# Patient Record
Sex: MALE | Race: WHITE | NOT HISPANIC OR LATINO | Employment: FULL TIME | URBAN - METROPOLITAN AREA
[De-identification: names, ages, dates, MRNs, and addresses within clinical notes are randomized per-mention and may not be internally consistent; named-entity substitution may affect disease eponyms.]

---

## 2019-03-12 ENCOUNTER — HOSPITAL ENCOUNTER (EMERGENCY)
Facility: OTHER | Age: 29
Discharge: HOME | End: 2019-03-13
Attending: EMERGENCY MEDICINE

## 2019-03-12 DIAGNOSIS — S09.90XA INJURY OF HEAD, INITIAL ENCOUNTER: Primary | ICD-10-CM

## 2019-03-12 DIAGNOSIS — S01.01XA SCALP LACERATION, INITIAL ENCOUNTER: ICD-10-CM

## 2019-03-12 PROCEDURE — 12034 INTMD RPR S/TR/EXT 7.6-12.5: CPT

## 2019-03-12 PROCEDURE — 99282 EMERGENCY DEPT VISIT SF MDM: CPT | Mod: 25

## 2019-03-12 RX ORDER — LIDOCAINE HYDROCHLORIDE 10 MG/ML
10 INJECTION INFILTRATION; PERINEURAL
Status: COMPLETED | OUTPATIENT
Start: 2019-03-12 | End: 2019-03-13

## 2019-03-13 ENCOUNTER — NURSE TRIAGE (OUTPATIENT)
Dept: ADMINISTRATIVE | Facility: CLINIC | Age: 29
End: 2019-03-13

## 2019-03-13 VITALS
WEIGHT: 140 LBS | TEMPERATURE: 98 F | SYSTOLIC BLOOD PRESSURE: 108 MMHG | HEIGHT: 70 IN | HEART RATE: 68 BPM | RESPIRATION RATE: 16 BRPM | BODY MASS INDEX: 20.04 KG/M2 | OXYGEN SATURATION: 98 % | DIASTOLIC BLOOD PRESSURE: 57 MMHG

## 2019-03-13 PROCEDURE — 90715 TDAP VACCINE 7 YRS/> IM: CPT | Performed by: EMERGENCY MEDICINE

## 2019-03-13 PROCEDURE — 63600175 PHARM REV CODE 636 W HCPCS: Performed by: EMERGENCY MEDICINE

## 2019-03-13 PROCEDURE — 25000003 PHARM REV CODE 250: Performed by: EMERGENCY MEDICINE

## 2019-03-13 PROCEDURE — 90471 IMMUNIZATION ADMIN: CPT | Performed by: EMERGENCY MEDICINE

## 2019-03-13 RX ADMIN — CLOSTRIDIUM TETANI TOXOID ANTIGEN (FORMALDEHYDE INACTIVATED), CORYNEBACTERIUM DIPHTHERIAE TOXOID ANTIGEN (FORMALDEHYDE INACTIVATED), BORDETELLA PERTUSSIS TOXOID ANTIGEN (GLUTARALDEHYDE INACTIVATED), BORDETELLA PERTUSSIS FILAMENTOUS HEMAGGLUTININ ANTIGEN (FORMALDEHYDE INACTIVATED), BORDETELLA PERTUSSIS PERTACTIN ANTIGEN, AND BORDETELLA PERTUSSIS FIMBRIAE 2/3 ANTIGEN 0.5 ML: 5; 2; 2.5; 5; 3; 5 INJECTION, SUSPENSION INTRAMUSCULAR at 12:03

## 2019-03-13 RX ADMIN — LIDOCAINE HYDROCHLORIDE 10 ML: 10 INJECTION, SOLUTION EPIDURAL; INFILTRATION; INTRACAUDAL; PERINEURAL at 12:03

## 2019-03-13 NOTE — ED TRIAGE NOTES
Pt arrives to ED with c/o head injury. PT reports playing baseball and falling hitting head, pt denies  LOC, N/VD, chest pain SOB. PT reports headache 6/10. 3 cm contusion noted to pts scalp with 1 cm laceration, no active bleeding at this time. Pt respiraitons even, unlabored, eyes open spontaneously, AAOx4. Pt placed on BP cycling and pulse ox.

## 2019-03-13 NOTE — ED PROVIDER NOTES
Encounter Date: 3/12/2019    SCRIBE #1 NOTE: I, Sosadayna Chong, am scribing for, and in the presence of, Dr. Freire.       History     Chief Complaint   Patient presents with    Head Injury     hit in head with a softball, laceration to posterior scalp. denies LOC.      Time seen by provider: 10:54 PM    This is a 28 y.o. male who presents with complaint of laceration to posterior head after falling onto ground and being kicked in the head by a cleat while playing softball.  He reports minimal headache, but most of his pain is in the laceration.  He denies any LOC.  He denies nausea, vomiting, or blurry vision.  He denies UTD tetanus.      The history is provided by the patient.     Review of patient's allergies indicates:  No Known Allergies  Past Medical History:   Diagnosis Date    GERD (gastroesophageal reflux disease)      Past Surgical History:   Procedure Laterality Date    none       Family History   Problem Relation Age of Onset    Hypertension Mother     Diabetes Paternal Grandmother     Stroke Paternal Grandfather      Social History     Tobacco Use    Smoking status: Never Smoker    Smokeless tobacco: Never Used   Substance Use Topics    Alcohol use: Yes     Alcohol/week: 1.8 oz     Types: 3 Standard drinks or equivalent per week     Comment: 3 drinks per week    Drug use: No     Review of Systems   Constitutional: Negative for chills and fever.   HENT: Negative for facial swelling.    Eyes: Negative for visual disturbance.   Respiratory: Negative for cough and shortness of breath.    Cardiovascular: Negative for chest pain.   Gastrointestinal: Negative for abdominal pain, diarrhea, nausea and vomiting.   Genitourinary: Negative for decreased urine volume, difficulty urinating, dysuria, enuresis, frequency, hematuria and urgency.   Musculoskeletal: Negative for back pain.   Skin: Positive for wound. Negative for color change, pallor and rash.   Neurological: Positive for headaches.  Negative for syncope.   Hematological: Negative for adenopathy. Does not bruise/bleed easily.       Physical Exam     Initial Vitals [03/12/19 2136]   BP Pulse Resp Temp SpO2   (!) 161/103 79 16 98.2 °F (36.8 °C) 99 %      MAP       --         Physical Exam    Nursing note and vitals reviewed.  Constitutional: He appears well-developed and well-nourished. He is not diaphoretic. No distress.   HENT:   Head: Normocephalic and atraumatic.   Mouth/Throat: No oropharyngeal exudate.   Posterior scalp laceration of approximately 10 cm linear with macerated central area.   Eyes: Conjunctivae and EOM are normal. No scleral icterus.   Neck: Normal range of motion. Neck supple.   No C-spine tenderness   Pulmonary/Chest: No respiratory distress.   Musculoskeletal: Normal range of motion. He exhibits no edema or tenderness.   Neurological: He is alert and oriented to person, place, and time. He has normal strength. No cranial nerve deficit or sensory deficit.   Skin: Skin is warm and dry. No rash noted. No erythema. No pallor.   Psychiatric: He has a normal mood and affect. His behavior is normal. Judgment and thought content normal.         ED Course   Lac Repair  Date/Time: 3/12/2019 11:07 PM  Performed by: Ye Freire MD  Authorized by: Ye Freire MD   Consent Done: Not Needed  Body area: head/neck  Location details: scalp  Laceration length: 10 cm  Tendon involvement: none  Nerve involvement: none  Vascular damage: no  Anesthesia: local infiltration    Anesthesia:  Local Anesthetic: lidocaine 1% without epinephrine  Anesthetic total: 5 mL  Patient sedated: no  Preparation: Patient was prepped and draped in the usual sterile fashion.  Irrigation solution: saline  Irrigation method: jet lavage  Amount of cleaning: extensive  Debridement: none  Degree of undermining: none  Skin closure: 4-0 Prolene  Number of sutures: 8  Technique: simple and horizontal mattress  Approximation: close  Approximation  difficulty: simple  Dressing: antibiotic ointment  Patient tolerance: Patient tolerated the procedure well with no immediate complications        Labs Reviewed - No data to display       Imaging Results    None          Medical Decision Making:   Initial Assessment:       28-year-old male presents s/p minor head trauma with posterior scalp laceration.  He was accidentally stepped on with a cleat while playing softball; no LOC or other concerning findings to suggest intracranial hemorrhage. Minimal headache now, and no other injuries or C-spine tenderness.   Laceration is 10 cm long with to 4 cm linear portions at either end, with 2 cm central macerated portion.  Due to central portion and wound not approximating easily, I do not think he has good candidate for staples.  Tetanus updated in ED.    After wound extensively cleaned and patient anesthetized, Prolene sutures used since it is blue and more easily found in patient's long hair.  Central portion closed with horizontal mattress technique, and linear portion closed with simple interrupted sutures with good cosmetic result.  Patient tolerated procedure well and will follow up in 10 days for suture removal.  He was educated on wound care and signs of wound infection, as well as head trauma precautions, and will return for this or any other concerns.                 Scribe Attestation:   Scribe #1: I performed the above scribed service and the documentation accurately describes the services I performed. I attest to the accuracy of the note.    Attending Attestation:           Physician Attestation for Scribe:  Physician Attestation Statement for Scribe #1: I, Dr. Freire, reviewed documentation, as scribed by in my presence, and it is both accurate and complete.                    Clinical Impression:     1. Injury of head, initial encounter    2. Scalp laceration, initial encounter                                 Ye Freire MD  03/16/19 0056

## 2019-03-14 NOTE — TELEPHONE ENCOUNTER
Girlfriend reported pt has sutures to back of head last night after hitting his head. Tonight they noted some dry blood and when they touch area there is a little wet blood, pt does have long hair. Is not noted to be actively bleeding and sutures are intact.    Reason for Disposition   Nursing judgment    Protocols used: ST NO GUIDELINE OR REFERENCE [id number]

## 2020-02-05 ENCOUNTER — TELEPHONE (OUTPATIENT)
Dept: INTERNAL MEDICINE | Facility: CLINIC | Age: 30
End: 2020-02-05

## 2020-02-05 NOTE — TELEPHONE ENCOUNTER
Spoke with pt and scheduled appt.  ----- Message from Melody Lauren sent at 2/5/2020  1:41 PM CST -----  Contact: pt-- [phone]  Type:  Sooner Apoointment Request    Caller is requesting a sooner appointment.  Caller declined first available appointment listed below.  Caller will not accept being placed on the waitlist and is requesting a message be sent to doctor.  Name of Caller: Pt    When is the first available appointment?3/12    Symptoms: injured wrist    Would the patient rather a call back or a response via Re[facility]? Call    Best Call Back Number: [phone]    Additional Information:  Pt called to schedule with the first available provider at the [facility]. He is requesting a call back.

## 2020-02-10 ENCOUNTER — LAB VISIT (OUTPATIENT)
Dept: LAB | Facility: OTHER | Age: 30
End: 2020-02-10
Attending: FAMILY MEDICINE
Payer: COMMERCIAL

## 2020-02-10 ENCOUNTER — TELEPHONE (OUTPATIENT)
Dept: INTERNAL MEDICINE | Facility: CLINIC | Age: 30
End: 2020-02-10

## 2020-02-10 ENCOUNTER — OFFICE VISIT (OUTPATIENT)
Dept: INTERNAL MEDICINE | Facility: CLINIC | Age: 30
End: 2020-02-10
Attending: FAMILY MEDICINE
Payer: COMMERCIAL

## 2020-02-10 VITALS
OXYGEN SATURATION: 99 % | SYSTOLIC BLOOD PRESSURE: 128 MMHG | RESPIRATION RATE: 12 BRPM | WEIGHT: 131.63 LBS | HEIGHT: 70 IN | BODY MASS INDEX: 18.85 KG/M2 | HEART RATE: 64 BPM | DIASTOLIC BLOOD PRESSURE: 88 MMHG

## 2020-02-10 DIAGNOSIS — J30.9 CHRONIC ALLERGIC RHINITIS: ICD-10-CM

## 2020-02-10 DIAGNOSIS — M25.531 CHRONIC PAIN OF RIGHT WRIST: ICD-10-CM

## 2020-02-10 DIAGNOSIS — G89.29 CHRONIC PAIN OF RIGHT WRIST: ICD-10-CM

## 2020-02-10 DIAGNOSIS — Z00.00 PREVENTATIVE HEALTH CARE: ICD-10-CM

## 2020-02-10 DIAGNOSIS — Z00.00 PREVENTATIVE HEALTH CARE: Primary | ICD-10-CM

## 2020-02-10 LAB
ALBUMIN SERPL BCP-MCNC: 5 G/DL (ref 3.5–5.2)
ALP SERPL-CCNC: 90 U/L (ref 55–135)
ALT SERPL W/O P-5'-P-CCNC: 24 U/L (ref 10–44)
ANION GAP SERPL CALC-SCNC: 5 MMOL/L (ref 8–16)
AST SERPL-CCNC: 26 U/L (ref 10–40)
BILIRUB SERPL-MCNC: 0.5 MG/DL (ref 0.1–1)
BUN SERPL-MCNC: 11 MG/DL (ref 6–20)
CALCIUM SERPL-MCNC: 10 MG/DL (ref 8.7–10.5)
CHLORIDE SERPL-SCNC: 105 MMOL/L (ref 95–110)
CHOLEST SERPL-MCNC: 174 MG/DL (ref 120–199)
CHOLEST/HDLC SERPL: 2.6 {RATIO} (ref 2–5)
CO2 SERPL-SCNC: 29 MMOL/L (ref 23–29)
CREAT SERPL-MCNC: 1.1 MG/DL (ref 0.5–1.4)
EST. GFR  (AFRICAN AMERICAN): >60 ML/MIN/1.73 M^2
EST. GFR  (NON AFRICAN AMERICAN): >60 ML/MIN/1.73 M^2
ESTIMATED AVG GLUCOSE: 100 MG/DL (ref 68–131)
GLUCOSE SERPL-MCNC: 92 MG/DL (ref 70–110)
HBA1C MFR BLD HPLC: 5.1 % (ref 4–5.6)
HDLC SERPL-MCNC: 68 MG/DL (ref 40–75)
HDLC SERPL: 39.1 % (ref 20–50)
LDLC SERPL CALC-MCNC: 91 MG/DL (ref 63–159)
NONHDLC SERPL-MCNC: 106 MG/DL
POTASSIUM SERPL-SCNC: 4.5 MMOL/L (ref 3.5–5.1)
PROT SERPL-MCNC: 8.1 G/DL (ref 6–8.4)
SODIUM SERPL-SCNC: 139 MMOL/L (ref 136–145)
TRIGL SERPL-MCNC: 75 MG/DL (ref 30–150)

## 2020-02-10 PROCEDURE — 99999 PR PBB SHADOW E&M-EST. PATIENT-LVL IV: ICD-10-PCS | Mod: PBBFAC,,, | Performed by: FAMILY MEDICINE

## 2020-02-10 PROCEDURE — 80061 LIPID PANEL: CPT

## 2020-02-10 PROCEDURE — 99385 PR PREVENTIVE VISIT,NEW,18-39: ICD-10-PCS | Mod: S$GLB,,, | Performed by: FAMILY MEDICINE

## 2020-02-10 PROCEDURE — 99999 PR PBB SHADOW E&M-EST. PATIENT-LVL IV: CPT | Mod: PBBFAC,,, | Performed by: FAMILY MEDICINE

## 2020-02-10 PROCEDURE — 83036 HEMOGLOBIN GLYCOSYLATED A1C: CPT

## 2020-02-10 PROCEDURE — 36415 COLL VENOUS BLD VENIPUNCTURE: CPT

## 2020-02-10 PROCEDURE — 80053 COMPREHEN METABOLIC PANEL: CPT

## 2020-02-10 PROCEDURE — 99385 PREV VISIT NEW AGE 18-39: CPT | Mod: S$GLB,,, | Performed by: FAMILY MEDICINE

## 2020-02-10 RX ORDER — CETIRIZINE HYDROCHLORIDE 10 MG/1
10 TABLET ORAL DAILY PRN
COMMUNITY
End: 2020-11-12

## 2020-02-10 RX ORDER — LORATADINE 10 MG/1
10 TABLET ORAL DAILY PRN
COMMUNITY
End: 2020-11-12

## 2020-02-10 NOTE — PROGRESS NOTES
"CHIEF COMPLAINT: Establish a primary care physician    HISTORY OF PRESENT ILLNESS: The patient is a generally healthy 29 year-old RHD white male.  The patient 8 months fell on his right hand/wrist.  There was never a deformity or significant swelling or bruising.  He had significant enough pain that he stopped working out entirely for a month and was quite uncomfortable even shaking hands.  He has sought No medical attn yet.  It has been a while since the patient has seen a primary care physician.  The patient wishes to establish a primary care physician.  The patient would also like to get some basic blood work done.    He has a long history of chronic allergic rhinitis.  Over-the-counter medications do not help.    REVIEW OF SYSTEMS:  GENERAL: No fever, chills, fatigability or weight loss.  SKIN: No rashes, itching or changes in color or texture of skin.  HEAD: No headaches or recent head trauma.  EYES: Visual acuity fine. No photophobia, ocular pain or diplopia.  EARS: Denies ear pain, discharge or vertigo.  NOSE: No loss of smell, no epistaxis.  MOUTH & THROAT: No hoarseness or change in voice. No excessive gum bleeding.  NODES: Denies swollen glands.  CHEST: Denies VELASQUEZ, cyanosis, wheezing, cough and sputum production.  CARDIOVASCULAR: Denies chest pain, PND, orthopnea or reduced exercise tolerance.  ABDOMEN: Appetite fine. No weight loss. Denies diarrhea, abdominal pain, hematemesis or blood in stool.  URINARY: No flank pain, dysuria or hematuria.  PERIPHERAL VASCULAR: No claudication or cyanosis.  MUSCULOSKELETAL: No joint stiffness or swelling. Denies back pain.  NEUROLOGIC: No history of seizures, paralysis, alteration of gait or coordination.    SOCIAL HISTORY: The patient does not smoke.  The patient consumes alcohol socially.  The patient is single.    PHYSICAL EXAMINATION:   Blood pressure 128/88, pulse 64, resp. rate 12, height 5' 10" (1.778 m), weight 59.7 kg (131 lb 9.8 oz), SpO2 99 %.    APPEARANCE: " Well nourished, well developed, in no acute distress.    HEAD: Normocephalic, atraumatic.  EYES: PERRL. EOMI.  Conjunctivae without injection and  anicteric  EARS: TM's intact. Light reflex normal. No retraction or perforation.    NOSE: Mucosa pink. Airway clear.  MOUTH & THROAT: No tonsillar enlargement. No pharyngeal erythema or exudate. No stridor.  NECK: Supple.   NODES: No cervical, axillary or inguinal lymph node enlargement.  CHEST: Lungs clear to auscultation.  No retractions are noted.  No rales or rhonchi are present.  CARDIOVASCULAR: Normal S1, S2. No rubs, murmurs or gallops.  ABDOMEN: Bowel sounds normal. Not distended. Soft. No tenderness or masses.  No ascites is noted.  MUSCULOSKELETAL:  There is no clubbing, cyanosis, or edema of the extremities x4.  There is full range of motion of the lumbar spine.  There is full range of motion of the extremities x4.  There is no deformity noted.  There is tenderness with range of motion of the right wrist  NEUROLOGIC:       Normal speech development.      Hearing normal.      Normal gait.      Motor and sensory exams grossly normal.  PSYCHIATRIC: Patient is alert and oriented x3.  Thought processes are all normal.  There is no homicidality.  There is no suicidality.  There is no evidence of psychosis.    LABORATORY/RADIOLOGY:   Chart reviewed.  We will update blood work today.    ASSESSMENT:   Annual  Right wrist pain  Chronic allergic rhinitis    PLAN:  Orthopedics referral  ENT referral  Follow up blood work  Return to clinic in one year.

## 2020-02-11 NOTE — TELEPHONE ENCOUNTER
"Attempted to call pt. No answer, LVM informing pt "Blood work looks good as we expected.  Cholesterol is particularly good.  No changes in medications.  Followup as scheduled " and to give the office a call back     "

## 2020-02-11 NOTE — TELEPHONE ENCOUNTER
----- Message from Oscar Berrios MD sent at 2/11/2020  7:59 AM CST -----  Blood work looks good as we expected.  Cholesterol is particularly good.  No changes in medications.  Followup as scheduled.

## 2020-02-17 ENCOUNTER — TELEPHONE (OUTPATIENT)
Dept: ORTHOPEDICS | Facility: CLINIC | Age: 30
End: 2020-02-17

## 2020-02-17 DIAGNOSIS — R52 PAIN: Primary | ICD-10-CM

## 2020-02-17 NOTE — TELEPHONE ENCOUNTER
Call placed to patient to remind them of appointment and xrays needed beforehand. Voicemail left for patient.       Jayla Torres LPN

## 2020-02-19 ENCOUNTER — HOSPITAL ENCOUNTER (OUTPATIENT)
Dept: RADIOLOGY | Facility: OTHER | Age: 30
Discharge: HOME | End: 2020-02-19
Attending: PHYSICIAN ASSISTANT
Payer: COMMERCIAL

## 2020-02-19 ENCOUNTER — OFFICE VISIT (OUTPATIENT)
Dept: ORTHOPEDICS | Facility: CLINIC | Age: 30
End: 2020-02-19
Attending: FAMILY MEDICINE
Payer: COMMERCIAL

## 2020-02-19 VITALS
DIASTOLIC BLOOD PRESSURE: 82 MMHG | BODY MASS INDEX: 18.75 KG/M2 | HEART RATE: 68 BPM | SYSTOLIC BLOOD PRESSURE: 117 MMHG | HEIGHT: 70 IN | WEIGHT: 131 LBS

## 2020-02-19 DIAGNOSIS — G89.29 CHRONIC PAIN OF RIGHT WRIST: ICD-10-CM

## 2020-02-19 DIAGNOSIS — R52 PAIN: ICD-10-CM

## 2020-02-19 DIAGNOSIS — M25.531 CHRONIC PAIN OF RIGHT WRIST: ICD-10-CM

## 2020-02-19 PROCEDURE — 3008F BODY MASS INDEX DOCD: CPT | Mod: CPTII,S$GLB,, | Performed by: PHYSICIAN ASSISTANT

## 2020-02-19 PROCEDURE — 73110 X-RAY EXAM OF WRIST: CPT | Mod: 26,RT,, | Performed by: RADIOLOGY

## 2020-02-19 PROCEDURE — 73110 XR WRIST COMPLETE 3 VIEWS RIGHT: ICD-10-PCS | Mod: 26,RT,, | Performed by: RADIOLOGY

## 2020-02-19 PROCEDURE — 99204 OFFICE O/P NEW MOD 45 MIN: CPT | Mod: S$GLB,,, | Performed by: PHYSICIAN ASSISTANT

## 2020-02-19 PROCEDURE — 73110 X-RAY EXAM OF WRIST: CPT | Mod: TC,FY,RT

## 2020-02-19 PROCEDURE — 3008F PR BODY MASS INDEX (BMI) DOCUMENTED: ICD-10-PCS | Mod: CPTII,S$GLB,, | Performed by: PHYSICIAN ASSISTANT

## 2020-02-19 PROCEDURE — 99204 PR OFFICE/OUTPT VISIT, NEW, LEVL IV, 45-59 MIN: ICD-10-PCS | Mod: S$GLB,,, | Performed by: PHYSICIAN ASSISTANT

## 2020-02-19 PROCEDURE — 99999 PR PBB SHADOW E&M-EST. PATIENT-LVL III: CPT | Mod: PBBFAC,,, | Performed by: PHYSICIAN ASSISTANT

## 2020-02-19 PROCEDURE — 99999 PR PBB SHADOW E&M-EST. PATIENT-LVL III: ICD-10-PCS | Mod: PBBFAC,,, | Performed by: PHYSICIAN ASSISTANT

## 2020-02-19 NOTE — PROGRESS NOTES
"Subjective:      Patient ID: Les Whitman III is a 29 y.o. male.    Chief Complaint: Pain of the Right Wrist      HPI  Les Whitman III is a 29 y.o. male presenting today for right wrist pain. He had a fall in the Summer of 2019. He tripped on the uneven sidewalk and landed on the right wrist. He has had persistent pain since this time. Pain is located on the ulnar aspect of the wrist. Pain is present with use, especially with any weight bearing. He has not been able to return to many of his regular hobbies including bowling and playing sports. He is unable to throw a ball or do push ups secondary to wrist pain. Denies numbness. He has not yet tried injections or therapy. He does not take anything for pain, he avoids eliciting activities.       Review of patient's allergies indicates:  No Known Allergies      Current Outpatient Medications   Medication Sig Dispense Refill    cetirizine (ZYRTEC) 10 MG tablet Take 10 mg by mouth daily as needed for Allergies.      loratadine (CLARITIN) 10 mg tablet Take 10 mg by mouth daily as needed for Allergies.      omeprazole (PRILOSEC) 20 MG capsule Take 20 mg by mouth once daily.       No current facility-administered medications for this visit.        Past Medical History:   Diagnosis Date    GERD (gastroesophageal reflux disease)        Past Surgical History:   Procedure Laterality Date    none         Review of Systems:  Constitutional: Negative for chills and fever.   Respiratory: Negative for cough and shortness of breath.    Gastrointestinal: Negative for nausea and vomiting.   Skin: Negative for rash.   Neurological: Negative for dizziness and headaches.   Psychiatric/Behavioral: Negative for depression.   MSK as in HPI       OBJECTIVE:     PHYSICAL EXAM:  /82   Pulse 68   Ht 5' 10" (1.778 m)   Wt 59.4 kg (131 lb)   BMI 18.80 kg/m²     GEN:  NAD, well-developed, well-groomed.  NEURO: Awake, alert, and oriented. Normal attention and concentration.  "   PSYCH: Normal mood and affect. Behavior is normal.  HEENT: No cervical lymphadenopathy noted.  CARDIOVASCULAR: Radial pulses 2+ bilaterally. No LE edema noted.  PULMONARY: Breath sounds normal. No respiratory distress.  SKIN: Intact, no rashes.      MSK:   RUE:  Good active ROM of the wrist and fingers. No edema or ecchymosis noted. no significant ttp over the wrist including the ECU. AIN/PIN/Radial/Median/Ulnar Nerves assessed in isolation without deficit. Radial & Ulnar arteries palpated 2+. Capillary Refill <3s.      RADIOGRAPHS:  Xray right wrist 2/19/20  FINDINGS:  There is no evidence for acute fracture or dislocation right wrist.  No focal bony erosion.  Questioned joint space loss radiocarpal joint.  Further evaluation as warranted clinically.  Comments: I have personally reviewed the imaging and I agree with the above radiologist's report.    ASSESSMENT/PLAN:       ICD-10-CM ICD-9-CM   1. Chronic pain of right wrist M25.531 719.43    G89.29 338.29       Orders Placed This Encounter    MRI Wrist Joint Without Contrast Right     Plan:   -plan for MRI   -RTC for results       The patient indicates understanding of these issues and agrees to the plan.    Opal Escobar PA-C  [facility]   [facility] [place], [place]

## 2020-02-19 NOTE — LETTER
February 19, 2020      Oscar Berrios MD  [address]  [facility] [address]           [facility] [place] [place] [address]  [address], [address]  [facility] [address]  Phone: [phone]          Patient: Les Whitman III   MR Number: [id number]   YOB: 1990   Date of Visit: 2/19/2020       Dear Dr. Oscar Berrios:    Thank you for referring Les Whitman to me for evaluation. Attached you will find relevant portions of my assessment and plan of care.    If you have questions, please do not hesitate to call me. I look forward to following Les Whitman along with you.    Sincerely,    Opal Escobar PA-C    Enclosure  CC:  No Recipients    If you would like to receive this communication electronically, please contact [email or web address] or [phone] to request more information on [employer] Link access.    For providers and/or their staff who would like to refer a patient to [facility], please contact us through our one-stop-shop provider referral line, [facility], at [phone].    If you feel you have received this communication in error or would no longer like to receive these types of communications, please e-mail [email or web address]

## 2020-02-24 ENCOUNTER — HOSPITAL ENCOUNTER (OUTPATIENT)
Dept: RADIOLOGY | Facility: HOSPITAL | Age: 30
Discharge: HOME | End: 2020-02-24
Attending: PHYSICIAN ASSISTANT
Payer: COMMERCIAL

## 2020-02-24 DIAGNOSIS — G89.29 CHRONIC PAIN OF RIGHT WRIST: ICD-10-CM

## 2020-02-24 DIAGNOSIS — M25.531 CHRONIC PAIN OF RIGHT WRIST: ICD-10-CM

## 2020-02-24 PROCEDURE — 73221 MRI WRIST WITHOUT CONTRAST RIGHT: ICD-10-PCS | Mod: 26,RT,, | Performed by: RADIOLOGY

## 2020-02-24 PROCEDURE — 73221 MRI JOINT UPR EXTREM W/O DYE: CPT | Mod: TC,RT

## 2020-02-24 PROCEDURE — 73221 MRI JOINT UPR EXTREM W/O DYE: CPT | Mod: 26,RT,, | Performed by: RADIOLOGY

## 2020-03-03 ENCOUNTER — TELEPHONE (OUTPATIENT)
Dept: ADMINISTRATIVE | Facility: OTHER | Age: 30
End: 2020-03-03

## 2020-03-03 NOTE — TELEPHONE ENCOUNTER
LM for patient to contact our office to discuss rescheduling appointment of 3-9-2020 with ENT department.

## 2020-03-05 ENCOUNTER — TELEPHONE (OUTPATIENT)
Dept: INTERNAL MEDICINE | Facility: CLINIC | Age: 30
End: 2020-03-05

## 2020-03-05 ENCOUNTER — TELEPHONE (OUTPATIENT)
Dept: ORTHOPEDICS | Facility: CLINIC | Age: 30
End: 2020-03-05

## 2020-03-05 NOTE — TELEPHONE ENCOUNTER
Left patient a reminder for his appointment on 3/10/20.Also if he had any questions he could call the office.

## 2020-03-10 ENCOUNTER — OFFICE VISIT (OUTPATIENT)
Dept: ORTHOPEDICS | Facility: CLINIC | Age: 30
End: 2020-03-10
Payer: COMMERCIAL

## 2020-03-10 VITALS
BODY MASS INDEX: 18.75 KG/M2 | HEART RATE: 68 BPM | HEIGHT: 70 IN | DIASTOLIC BLOOD PRESSURE: 85 MMHG | SYSTOLIC BLOOD PRESSURE: 124 MMHG | WEIGHT: 131 LBS

## 2020-03-10 DIAGNOSIS — S62.154A: Primary | ICD-10-CM

## 2020-03-10 PROCEDURE — 99214 PR OFFICE/OUTPT VISIT, EST, LEVL IV, 30-39 MIN: ICD-10-PCS | Mod: S$GLB,,, | Performed by: ORTHOPAEDIC SURGERY

## 2020-03-10 PROCEDURE — 3008F PR BODY MASS INDEX (BMI) DOCUMENTED: ICD-10-PCS | Mod: CPTII,S$GLB,, | Performed by: ORTHOPAEDIC SURGERY

## 2020-03-10 PROCEDURE — 99999 PR PBB SHADOW E&M-EST. PATIENT-LVL III: ICD-10-PCS | Mod: PBBFAC,,, | Performed by: ORTHOPAEDIC SURGERY

## 2020-03-10 PROCEDURE — 99214 OFFICE O/P EST MOD 30 MIN: CPT | Mod: S$GLB,,, | Performed by: ORTHOPAEDIC SURGERY

## 2020-03-10 PROCEDURE — 3008F BODY MASS INDEX DOCD: CPT | Mod: CPTII,S$GLB,, | Performed by: ORTHOPAEDIC SURGERY

## 2020-03-10 PROCEDURE — 99999 PR PBB SHADOW E&M-EST. PATIENT-LVL III: CPT | Mod: PBBFAC,,, | Performed by: ORTHOPAEDIC SURGERY

## 2020-03-10 NOTE — PROGRESS NOTES
"Subjective:      Patient ID: Les Whitman III is a 29 y.o. male.    Chief Complaint: Pain of the Right Wrist    Interval History (3/10/20):  Pt returns for MRI followup of R wrist. Reports persistent daily pain ulnar aspect of the right wrist. Pain worse with acitivities.     HPI  Les Whitman III is a 29 y.o. male presenting today for right wrist pain. He had a fall in the Summer of 2019. He tripped on the uneven sidewalk and landed on the right wrist. He has had persistent pain since this time. Pain is located on the ulnar aspect of the wrist. Pain is present with use, especially with any weight bearing. He has not been able to return to many of his regular hobbies including bowling and playing sports. He is unable to throw a ball or do push ups secondary to wrist pain. Denies numbness. He has not yet tried injections or therapy. He does not take anything for pain, he avoids eliciting activities.       Review of patient's allergies indicates:  No Known Allergies      Current Outpatient Medications   Medication Sig Dispense Refill    cetirizine (ZYRTEC) 10 MG tablet Take 10 mg by mouth daily as needed for Allergies.      loratadine (CLARITIN) 10 mg tablet Take 10 mg by mouth daily as needed for Allergies.      omeprazole (PRILOSEC) 20 MG capsule Take 20 mg by mouth once daily.       No current facility-administered medications for this visit.        Past Medical History:   Diagnosis Date    GERD (gastroesophageal reflux disease)        Past Surgical History:   Procedure Laterality Date    none         Review of Systems:  Constitutional: Negative for chills and fever.   Respiratory: Negative for cough and shortness of breath.    Gastrointestinal: Negative for nausea and vomiting.   Skin: Negative for rash.   Neurological: Negative for dizziness and headaches.   Psychiatric/Behavioral: Negative for depression.   MSK as in HPI       OBJECTIVE:     PHYSICAL EXAM:  /85   Pulse 68   Ht 5' 10" (1.778 m) "   Wt 59.4 kg (131 lb)   BMI 18.80 kg/m²     GEN:  NAD, well-developed, well-groomed.  NEURO: Awake, alert, and oriented. Normal attention and concentration.    PSYCH: Normal mood and affect. Behavior is normal.  HEENT: No cervical lymphadenopathy noted.  CARDIOVASCULAR: Radial pulses 2+ bilaterally. No LE edema noted.  PULMONARY: Breath sounds normal. No respiratory distress.  SKIN: Intact, no rashes.      MSK:   RUE:  Good active ROM of the wrist and fingers. No edema or ecchymosis noted. no significant ttp over the wrist including the ECU. AIN/PIN/Radial/Median/Ulnar Nerves assessed in isolation without deficit. Radial & Ulnar arteries palpated 2+. Capillary Refill <3s.      RADIOGRAPHS:  Xray right wrist 2/19/20  FINDINGS:  There is no evidence for acute fracture or dislocation right wrist.  No focal bony erosion.  Questioned joint space loss radiocarpal joint.  Further evaluation as warranted clinically.  Comments: I have personally reviewed the imaging and I agree with the above radiologist's report.    MRI- 2/24/20:  Age indeterminate nondisplaced fracture of the hook of the hamate.    ASSESSMENT/PLAN:       ICD-10-CM ICD-9-CM   1. Nondisplaced fracture of hook process of hamate (unciform) bone, right wrist, initial encounter for closed fracture S62.154A 814.08          Plan:   -discussed txt options including nonop management versus operative intervention. Will plan for excision of hook of hamate fx fragment. The risks, benefits and alternatives to surgery were discussed with the patient at great length.  These include bleeding, infection, vessel/nerve damage, pain, numbness, tingling, complex regional pain syndrome, hardware/surgical failure, need for further surgery, stiffness, malunion, nonunion, DVT, PE, arthritis and death.  Patient states an understanding and wishes to proceed with surgery.   All questions were answered.  No guarantees were implied or stated.  Informed consent was  obtained.

## 2020-03-10 NOTE — PROGRESS NOTES
I have personally taken the history and examined this patient. I agree with the resident's note as stated above. Hook of hamate excision.  I have explained the risks, benefits, and alternatives of the procedure to the patient in great detail. The patient voices understanding and all questions have been answered. The patient agrees with to proceed as planned. Consents were performed in clinic.  No N/T, no weakness

## 2020-03-12 DIAGNOSIS — S62.151A CLOSED DISPLACED FRACTURE OF HOOK PROCESS OF HAMATE OF RIGHT WRIST, INITIAL ENCOUNTER: Primary | ICD-10-CM

## 2020-03-20 ENCOUNTER — TELEPHONE (OUTPATIENT)
Dept: ORTHOPEDICS | Facility: CLINIC | Age: 30
End: 2020-03-20

## 2020-03-20 NOTE — TELEPHONE ENCOUNTER
Spoke c pt. Informed pt that due to recommendations from the Surgeon General & [facility], [facility] is requiring Dr. Franco to reschedule all non-emergent surgeries. R/s surgery  05/11/20. Adjusted PO appt. Pt expressed understanding & was thankful.

## 2020-03-20 NOTE — TELEPHONE ENCOUNTER
Attempted to contact pt. Left voicemail. Asked pt to return call to clinic at [phone] to discuss 03/30/20 surgery.     ==  Pt's surgery needs to be r/s due to COVID-19 restrictions on elective cases. PO appt cancelled.

## 2020-04-30 ENCOUNTER — TELEPHONE (OUTPATIENT)
Dept: SPORTS MEDICINE | Facility: CLINIC | Age: 30
End: 2020-04-30

## 2020-04-30 NOTE — TELEPHONE ENCOUNTER
Attempted to contact pt. Left voicemail. Asked pt to return call to clinic at [phone] to discuss 05/11/20 surgery. Can be r/s to 05/18/20 or any Monday/Friday the first 3 weeks of June.

## 2020-05-01 ENCOUNTER — TELEPHONE (OUTPATIENT)
Dept: ORTHOPEDICS | Facility: CLINIC | Age: 30
End: 2020-05-01

## 2020-05-01 DIAGNOSIS — Z01.818 PRE-OP TESTING: Primary | ICD-10-CM

## 2020-05-01 NOTE — TELEPHONE ENCOUNTER
----- Message from Lisa Rome sent at 4/30/2020  5:01 PM CDT -----  Contact: KELSY LANE III [[id number]]      ----- Message -----  From: Jose Nieto  Sent: 4/30/2020   3:07 PM CDT  To: Richie ANDREA Staff    Type:  Patient Returning Call    Who Called: KELSY LANE III [[id number]]    Who Left Message for Patient: Lisa    Does the patient know what this is regarding?: yes    Would the patient rather a call back or a response via [facility]? call    Best Call Back Number: [phone]    Additional Information: Patient states the 18th would be great. Please advise

## 2020-05-01 NOTE — TELEPHONE ENCOUNTER
Spoke c pt. Informed him Dr. Franco will be out of the office the weeks of May 11th. R/s 05/11/20 surgery to 05/18/20. R/s PO appt & informed pt of COVID-19 testing requirement. Pt expressed understanding & was thankful.     ==  COVID19 lab order sent to Dr. Franco.

## 2020-05-11 ENCOUNTER — ANESTHESIA EVENT (OUTPATIENT)
Dept: SURGERY | Facility: HOSPITAL | Age: 30
End: 2020-05-11
Payer: COMMERCIAL

## 2020-05-11 NOTE — ANESTHESIA PREPROCEDURE EVALUATION
Chart review complete. Patient's medical history reviewed.  OK to proceed at [facility].   5/11/2020 05/11/2020  Les Whitman III is a 29 y.o., male.    Anesthesia Evaluation         Review of Systems  Anesthesia Hx:  No problems with previous Anesthesia History of prior surgery of interest to airway management or planning: Denies Family Hx of Anesthesia complications.   Denies Personal Hx of Anesthesia complications.   Social:  Non-Smoker, No Alcohol Use    Cardiovascular:  Cardiovascular Normal     Hepatic/GI:   GERD, well controlled    Neurological:  Neurology Normal    Endocrine:  Endocrine Normal    Psych:  Psychiatric Normal           Physical Exam  General:  Well nourished    Airway/Jaw/Neck:  Airway Findings: Mouth Opening: Normal Tongue: Normal  General Airway Assessment: Adult  Mallampati: I  Jaw/Neck Findings:  Neck ROM: Normal ROM      Dental:  Dental Findings: In tact   Chest/Lungs:  Chest/Lungs Findings: Clear to auscultation     Heart/Vascular:  Heart Findings: Rate: Normal  Rhythm: Regular Rhythm  Sounds: Normal        Mental Status:  Mental Status Findings:  Cooperative         Anesthesia Plan  Type of Anesthesia, risks & benefits discussed:  Anesthesia Type:  general, regional, MAC  Patient's Preference:   Intra-op Monitoring Plan:   Intra-op Monitoring Plan Comments:   Post Op Pain Control Plan:   Post Op Pain Control Plan Comments:   Induction:   IV  Beta Blocker:  Patient is not currently on a Beta-Blocker (No further documentation required).       Informed Consent: Patient understands risks and agrees with Anesthesia plan.  Questions answered. Anesthesia consent signed with patient.  ASA Score: 1     Day of Surgery Review of History & Physical:    H&P update referred to the surgeon.         Ready For Surgery From Anesthesia Perspective.

## 2020-05-13 ENCOUNTER — TELEPHONE (OUTPATIENT)
Dept: ORTHOPEDICS | Facility: CLINIC | Age: 30
End: 2020-05-13

## 2020-05-13 NOTE — TELEPHONE ENCOUNTER
Spoke c pt. Informed him of COVID-19 test appt, 1100 05/16/20. Pt expressed understanding & was thankful.

## 2020-05-14 RX ORDER — OXYCODONE AND ACETAMINOPHEN 5; 325 MG/1; MG/1
1 TABLET ORAL EVERY 6 HOURS PRN
Qty: 25 TABLET | Refills: 0 | Status: SHIPPED | OUTPATIENT
Start: 2020-05-14 | End: 2020-08-19

## 2020-05-14 NOTE — H&P
The patient has been examined and the H&P has been reviewed:    I concur with the findings and no changes have occurred since H&P was written. Surgical site marked with patient participation.    Anesthesia/Surgery risks, benefits and alternative options discussed and understood by patient/family.    3/12/2020     Subjective:       Patient ID: Les Whitman III is a 29 y.o. male.     Chief Complaint: Pain of the Right Wrist     Interval History (3/10/20):  Pt returns for MRI followup of R wrist. Reports persistent daily pain ulnar aspect of the right wrist. Pain worse with acitivities.      HPI  Les Whitman III is a 29 y.o. male presenting today for right wrist pain. He had a fall in the Summer of 2019. He tripped on the uneven sidewalk and landed on the right wrist. He has had persistent pain since this time. Pain is located on the ulnar aspect of the wrist. Pain is present with use, especially with any weight bearing. He has not been able to return to many of his regular hobbies including bowling and playing sports. He is unable to throw a ball or do push ups secondary to wrist pain. Denies numbness. He has not yet tried injections or therapy. He does not take anything for pain, he avoids eliciting activities.         Review of patient's allergies indicates:  No Known Allergies       Current Medications          Current Outpatient Medications   Medication Sig Dispense Refill    cetirizine (ZYRTEC) 10 MG tablet Take 10 mg by mouth daily as needed for Allergies.        loratadine (CLARITIN) 10 mg tablet Take 10 mg by mouth daily as needed for Allergies.        omeprazole (PRILOSEC) 20 MG capsule Take 20 mg by mouth once daily.          No current facility-administered medications for this visit.                  Past Medical History:   Diagnosis Date    GERD (gastroesophageal reflux disease)                 Past Surgical History:   Procedure Laterality Date    none             Review of  "Systems:  Constitutional: Negative for chills and fever.   Respiratory: Negative for cough and shortness of breath.    Gastrointestinal: Negative for nausea and vomiting.   Skin: Negative for rash.   Neurological: Negative for dizziness and headaches.   Psychiatric/Behavioral: Negative for depression.   MSK as in HPI         OBJECTIVE:      PHYSICAL EXAM:  /85   Pulse 68   Ht 5' 10" (1.778 m)   Wt 59.4 kg (131 lb)   BMI 18.80 kg/m²      GEN:  NAD, well-developed, well-groomed.  NEURO: Awake, alert, and oriented. Normal attention and concentration.    PSYCH: Normal mood and affect. Behavior is normal.  HEENT: No cervical lymphadenopathy noted.  CARDIOVASCULAR: Radial pulses 2+ bilaterally. No LE edema noted.  PULMONARY: Breath sounds normal. No respiratory distress.  SKIN: Intact, no rashes.       MSK:   RUE:  Good active ROM of the wrist and fingers. No edema or ecchymosis noted. no significant ttp over the wrist including the ECU. AIN/PIN/Radial/Median/Ulnar Nerves assessed in isolation without deficit. Radial & Ulnar arteries palpated 2+. Capillary Refill <3s.        RADIOGRAPHS:  Xray right wrist 2/19/20  FINDINGS:  There is no evidence for acute fracture or dislocation right wrist.  No focal bony erosion.  Questioned joint space loss radiocarpal joint.  Further evaluation as warranted clinically.  Comments: I have personally reviewed the imaging and I agree with the above radiologist's report.     MRI- 2/24/20:  Age indeterminate nondisplaced fracture of the hook of the hamate.     ASSESSMENT/PLAN:          ICD-10-CM ICD-9-CM   1. Nondisplaced fracture of hook process of hamate (unciform) bone, right wrist, initial encounter for closed fracture S62.154A 814.08         Plan:   -discussed txt options including nonop management versus operative intervention. Will plan for excision of hook of hamate fx fragment. The risks, benefits and alternatives to surgery were discussed with the patient at great " length.  These include bleeding, infection, vessel/nerve damage, pain, numbness, tingling, complex regional pain syndrome, hardware/surgical failure, need for further surgery, stiffness, malunion, nonunion, DVT, PE, arthritis and death.  Patient states an understanding and wishes to proceed with surgery.   All questions were answered.  No guarantees were implied or stated.  Informed consent was obtained.

## 2020-05-15 ENCOUNTER — TELEPHONE (OUTPATIENT)
Dept: ORTHOPEDICS | Facility: CLINIC | Age: 30
End: 2020-05-15

## 2020-05-15 NOTE — TELEPHONE ENCOUNTER
Spoke c pt. Informed pt of 0630 arrival time for 05/18/20 surgery at the [facility]. Reminded pt of COVID-19 testing. Pt expressed understanding & was thankful.

## 2020-05-16 ENCOUNTER — LAB VISIT (OUTPATIENT)
Dept: INTERNAL MEDICINE | Facility: CLINIC | Age: 30
End: 2020-05-16
Payer: COMMERCIAL

## 2020-05-16 DIAGNOSIS — Z01.818 PRE-OP TESTING: ICD-10-CM

## 2020-05-16 PROCEDURE — U0003 INFECTIOUS AGENT DETECTION BY NUCLEIC ACID (DNA OR RNA); SEVERE ACUTE RESPIRATORY SYNDROME CORONAVIRUS 2 (SARS-COV-2) (CORONAVIRUS DISEASE [COVID-19]), AMPLIFIED PROBE TECHNIQUE, MAKING USE OF HIGH THROUGHPUT TECHNOLOGIES AS DESCRIBED BY CMS-2020-01-R: HCPCS

## 2020-05-17 LAB — SARS-COV-2 RNA RESP QL NAA+PROBE: NOT DETECTED

## 2020-05-18 ENCOUNTER — HOSPITAL ENCOUNTER (OUTPATIENT)
Facility: HOSPITAL | Age: 30
Discharge: HOME | End: 2020-05-18
Attending: ORTHOPAEDIC SURGERY | Admitting: ORTHOPAEDIC SURGERY
Payer: COMMERCIAL

## 2020-05-18 ENCOUNTER — DOCUMENTATION ONLY (OUTPATIENT)
Dept: ORTHOPEDICS | Facility: CLINIC | Age: 30
End: 2020-05-18

## 2020-05-18 ENCOUNTER — ANESTHESIA (OUTPATIENT)
Dept: SURGERY | Facility: HOSPITAL | Age: 30
End: 2020-05-18
Payer: COMMERCIAL

## 2020-05-18 DIAGNOSIS — S62.153A: Primary | ICD-10-CM

## 2020-05-18 PROCEDURE — 27201423 OPTIME MED/SURG SUP & DEVICES STERILE SUPPLY: Performed by: ORTHOPAEDIC SURGERY

## 2020-05-18 PROCEDURE — 64415 NJX AA&/STRD BRCH PLXS IMG: CPT | Mod: 59,RT,, | Performed by: ANESTHESIOLOGY

## 2020-05-18 PROCEDURE — D9220A PRA ANESTHESIA: Mod: ANES,,, | Performed by: ANESTHESIOLOGY

## 2020-05-18 PROCEDURE — 76942 ECHO GUIDE FOR BIOPSY: CPT | Mod: 26,,, | Performed by: ANESTHESIOLOGY

## 2020-05-18 PROCEDURE — 37000009 HC ANESTHESIA EA ADD 15 MINS: Performed by: ORTHOPAEDIC SURGERY

## 2020-05-18 PROCEDURE — 25645 OPTX CRPL FX OTH/THN SCPH EA: CPT | Mod: RT,,, | Performed by: ORTHOPAEDIC SURGERY

## 2020-05-18 PROCEDURE — 94761 N-INVAS EAR/PLS OXIMETRY MLT: CPT

## 2020-05-18 PROCEDURE — 25000003 PHARM REV CODE 250: Performed by: ORTHOPAEDIC SURGERY

## 2020-05-18 PROCEDURE — 76942 PR U/S GUIDANCE FOR NEEDLE GUIDANCE: ICD-10-PCS | Mod: 26,,, | Performed by: ANESTHESIOLOGY

## 2020-05-18 PROCEDURE — D9220A PRA ANESTHESIA: ICD-10-PCS | Mod: ANES,,, | Performed by: ANESTHESIOLOGY

## 2020-05-18 PROCEDURE — 36000709 HC OR TIME LEV III EA ADD 15 MIN: Performed by: ORTHOPAEDIC SURGERY

## 2020-05-18 PROCEDURE — 25000003 PHARM REV CODE 250: Performed by: ANESTHESIOLOGY

## 2020-05-18 PROCEDURE — 63600175 PHARM REV CODE 636 W HCPCS: Performed by: STUDENT IN AN ORGANIZED HEALTH CARE EDUCATION/TRAINING PROGRAM

## 2020-05-18 PROCEDURE — 27200750 HC INSULATED NEEDLE/ STIMUPLEX: Performed by: ANESTHESIOLOGY

## 2020-05-18 PROCEDURE — 64415 PR NERVE BLOCK INJ, ANES/STEROID, BRACHIAL PLEXUS, INCL IMAG GUIDANCE: ICD-10-PCS | Mod: 59,RT,, | Performed by: ANESTHESIOLOGY

## 2020-05-18 PROCEDURE — 37000008 HC ANESTHESIA 1ST 15 MINUTES: Performed by: ORTHOPAEDIC SURGERY

## 2020-05-18 PROCEDURE — 71000033 HC RECOVERY, INTIAL HOUR: Performed by: ORTHOPAEDIC SURGERY

## 2020-05-18 PROCEDURE — 63600175 PHARM REV CODE 636 W HCPCS: Performed by: NURSE ANESTHETIST, CERTIFIED REGISTERED

## 2020-05-18 PROCEDURE — 25000003 PHARM REV CODE 250: Performed by: STUDENT IN AN ORGANIZED HEALTH CARE EDUCATION/TRAINING PROGRAM

## 2020-05-18 PROCEDURE — D9220A PRA ANESTHESIA: ICD-10-PCS | Mod: CRNA,,, | Performed by: NURSE ANESTHETIST, CERTIFIED REGISTERED

## 2020-05-18 PROCEDURE — 63600175 PHARM REV CODE 636 W HCPCS: Performed by: ANESTHESIOLOGY

## 2020-05-18 PROCEDURE — 99900035 HC TECH TIME PER 15 MIN (STAT)

## 2020-05-18 PROCEDURE — 64415 NJX AA&/STRD BRCH PLXS IMG: CPT | Performed by: ANESTHESIOLOGY

## 2020-05-18 PROCEDURE — D9220A PRA ANESTHESIA: Mod: CRNA,,, | Performed by: NURSE ANESTHETIST, CERTIFIED REGISTERED

## 2020-05-18 PROCEDURE — 71000015 HC POSTOP RECOV 1ST HR: Performed by: ORTHOPAEDIC SURGERY

## 2020-05-18 PROCEDURE — 25000003 PHARM REV CODE 250: Performed by: NURSE ANESTHETIST, CERTIFIED REGISTERED

## 2020-05-18 PROCEDURE — 25645 PR OPEN RX CARPAL BONE FX,EACH BONE: ICD-10-PCS | Mod: RT,,, | Performed by: ORTHOPAEDIC SURGERY

## 2020-05-18 PROCEDURE — 76942 ECHO GUIDE FOR BIOPSY: CPT | Performed by: ANESTHESIOLOGY

## 2020-05-18 PROCEDURE — 36000708 HC OR TIME LEV III 1ST 15 MIN: Performed by: ORTHOPAEDIC SURGERY

## 2020-05-18 RX ORDER — MIDAZOLAM HYDROCHLORIDE 1 MG/ML
INJECTION, SOLUTION INTRAMUSCULAR; INTRAVENOUS
Status: DISCONTINUED | OUTPATIENT
Start: 2020-05-18 | End: 2020-05-18

## 2020-05-18 RX ORDER — FENTANYL CITRATE 50 UG/ML
25 INJECTION, SOLUTION INTRAMUSCULAR; INTRAVENOUS EVERY 5 MIN PRN
Status: DISCONTINUED | OUTPATIENT
Start: 2020-05-18 | End: 2020-05-18 | Stop reason: HOSPADM

## 2020-05-18 RX ORDER — KETAMINE HCL IN 0.9 % NACL 50 MG/5 ML
SYRINGE (ML) INTRAVENOUS
Status: DISCONTINUED | OUTPATIENT
Start: 2020-05-18 | End: 2020-05-18

## 2020-05-18 RX ORDER — CEFAZOLIN SODIUM 1 G/3ML
2 INJECTION, POWDER, FOR SOLUTION INTRAMUSCULAR; INTRAVENOUS
Status: COMPLETED | OUTPATIENT
Start: 2020-05-18 | End: 2020-05-18

## 2020-05-18 RX ORDER — MUPIROCIN 20 MG/G
OINTMENT TOPICAL
Status: DISCONTINUED | OUTPATIENT
Start: 2020-05-18 | End: 2020-05-18 | Stop reason: HOSPADM

## 2020-05-18 RX ORDER — MIDAZOLAM HYDROCHLORIDE 1 MG/ML
2 INJECTION INTRAMUSCULAR; INTRAVENOUS ONCE
Status: COMPLETED | OUTPATIENT
Start: 2020-05-18 | End: 2020-05-18

## 2020-05-18 RX ORDER — PROPOFOL 10 MG/ML
VIAL (ML) INTRAVENOUS CONTINUOUS PRN
Status: DISCONTINUED | OUTPATIENT
Start: 2020-05-18 | End: 2020-05-18

## 2020-05-18 RX ORDER — OXYCODONE HYDROCHLORIDE 5 MG/1
5 TABLET ORAL
Status: DISCONTINUED | OUTPATIENT
Start: 2020-05-18 | End: 2020-05-18 | Stop reason: HOSPADM

## 2020-05-18 RX ORDER — SODIUM CHLORIDE 9 MG/ML
INJECTION, SOLUTION INTRAVENOUS CONTINUOUS
Status: DISCONTINUED | OUTPATIENT
Start: 2020-05-18 | End: 2020-05-18 | Stop reason: HOSPADM

## 2020-05-18 RX ORDER — LIDOCAINE HYDROCHLORIDE 20 MG/ML
INJECTION INTRAVENOUS
Status: DISCONTINUED | OUTPATIENT
Start: 2020-05-18 | End: 2020-05-18

## 2020-05-18 RX ORDER — CELECOXIB 200 MG/1
200 CAPSULE ORAL
Status: COMPLETED | OUTPATIENT
Start: 2020-05-18 | End: 2020-05-18

## 2020-05-18 RX ORDER — ACETAMINOPHEN 500 MG
1000 TABLET ORAL
Status: COMPLETED | OUTPATIENT
Start: 2020-05-18 | End: 2020-05-18

## 2020-05-18 RX ORDER — BACITRACIN ZINC 500 UNIT/G
OINTMENT (GRAM) TOPICAL
Status: DISCONTINUED | OUTPATIENT
Start: 2020-05-18 | End: 2020-05-18 | Stop reason: HOSPADM

## 2020-05-18 RX ADMIN — MIDAZOLAM 2 MG: 1 INJECTION INTRAMUSCULAR; INTRAVENOUS at 08:05

## 2020-05-18 RX ADMIN — CELECOXIB 200 MG: 200 CAPSULE ORAL at 06:05

## 2020-05-18 RX ADMIN — Medication 20 MG: at 08:05

## 2020-05-18 RX ADMIN — LIDOCAINE HYDROCHLORIDE 60 MG: 20 INJECTION, SOLUTION INTRAVENOUS at 08:05

## 2020-05-18 RX ADMIN — SODIUM CHLORIDE 1000 ML: 0.9 INJECTION, SOLUTION INTRAVENOUS at 06:05

## 2020-05-18 RX ADMIN — MIDAZOLAM HYDROCHLORIDE 4 MG: 1 INJECTION, SOLUTION INTRAMUSCULAR; INTRAVENOUS at 07:05

## 2020-05-18 RX ADMIN — MUPIROCIN: 20 OINTMENT TOPICAL at 06:05

## 2020-05-18 RX ADMIN — SODIUM CHLORIDE, SODIUM GLUCONATE, SODIUM ACETATE, POTASSIUM CHLORIDE, MAGNESIUM CHLORIDE, SODIUM PHOSPHATE, DIBASIC, AND POTASSIUM PHOSPHATE: .53; .5; .37; .037; .03; .012; .00082 INJECTION, SOLUTION INTRAVENOUS at 09:05

## 2020-05-18 RX ADMIN — PROPOFOL 125 MCG/KG/MIN: 10 INJECTION, EMULSION INTRAVENOUS at 08:05

## 2020-05-18 RX ADMIN — CEFAZOLIN 2 G: 330 INJECTION, POWDER, FOR SOLUTION INTRAMUSCULAR; INTRAVENOUS at 08:05

## 2020-05-18 RX ADMIN — ACETAMINOPHEN 1000 MG: 500 TABLET ORAL at 06:05

## 2020-05-18 NOTE — PLAN OF CARE
Patient is AAO and VSS.  Tolerating PO and states pain is tolerable.  Dressing CDI.  Patient states they are ready for d/c.  IV removed.  Catheter tip intact.   Discharge instructions reviewed and copy given to the patient.  Questions answered and verbalized understanding. Medication delivered to bedside. Patient wheeled to car by RN with arm in sling.

## 2020-05-18 NOTE — ANESTHESIA POSTPROCEDURE EVALUATION
Anesthesia Post Evaluation    Patient: Les Whitman III    Procedure(s) Performed: Procedure(s) (LRB):  OSTECTOMY, CARPAL BONE, RIGHT HOOK OF HAMATE EXCISION (Right)    Final Anesthesia Type: general    Patient location during evaluation: PACU  Patient participation: Yes- Able to Participate  Level of consciousness: awake and alert  Post-procedure vital signs: reviewed and stable  Pain management: adequate  Airway patency: patent    PONV status at discharge: No PONV  Anesthetic complications: no      Cardiovascular status: blood pressure returned to baseline  Respiratory status: unassisted  Hydration status: euvolemic  Follow-up not needed.          Vitals Value Taken Time   /66 5/18/2020 10:47 AM   Temp 36.4 °C (97.5 °F) 5/18/2020 10:20 AM   Pulse 64 5/18/2020 11:00 AM   Resp 12 5/18/2020 11:00 AM   SpO2 100 % 5/18/2020 11:00 AM   Vitals shown include unvalidated device data.      No case tracking events are documented in the log.      Pain/Jim Score: Pain Rating Prior to Med Admin: 0 (5/18/2020  6:49 AM)  Jim Score: 9 (5/18/2020 10:20 AM)

## 2020-05-18 NOTE — TRANSFER OF CARE
Anesthesia Transfer of Care Note    Patient: Les Whitman III    Procedure(s) Performed: Procedure(s) (LRB):  OSTECTOMY, CARPAL BONE, RIGHT HOOK OF HAMATE EXCISION (Right)    Patient location: PACU    Anesthesia Type: general    Transport from OR: Transported from OR on room air with adequate spontaneous ventilation    Post pain: adequate analgesia    Post assessment: no apparent anesthetic complications    Post vital signs: stable    Level of consciousness: awake and sedated    Nausea/Vomiting: no nausea/vomiting    Complications: none    Transfer of care protocol was followed      Last vitals:   Visit Vitals  /67 (BP Location: Left arm, Patient Position: Lying)   Pulse (!) 52   Temp 36.7 °C (98 °F) (Oral)   Resp 18   Wt 61.2 kg (135 lb)   SpO2 100%   BMI 19.37 kg/m²

## 2020-05-18 NOTE — ANESTHESIA PROCEDURE NOTES
R SC SS    Patient location during procedure: pre-op   Block not for primary anesthetic.  Reason for block: at surgeon's request and post-op pain management   Post-op Pain Location: right hand pain   Start time: 5/18/2020 7:12 AM  Timeout: 5/18/2020 7:10 AM   End time: 5/18/2020 7:16 AM    Staffing  Authorizing Provider: Yesenia Wilson MD  Performing Provider: Yesenia Wilson MD    Preanesthetic Checklist  Completed: patient identified, site marked, surgical consent, pre-op evaluation, timeout performed, IV checked, risks and benefits discussed and monitors and equipment checked  Peripheral Block  Patient position: supine  Prep: ChloraPrep  Patient monitoring: heart rate, cardiac monitor, continuous pulse ox, continuous capnometry and frequent blood pressure checks  Block type: supraclavicular  Laterality: right  Injection technique: single shot  Needle  Needle type: Stimuplex   Needle gauge: 22 G  Needle length: 2 in  Needle localization: anatomical landmarks and ultrasound guidance  Needle insertion depth: 2 cm   -ultrasound image captured on disc.  Assessment  Injection assessment: negative aspiration, negative parasthesia and local visualized surrounding nerve  Paresthesia pain: none  Heart rate change: no  Slow fractionated injection: yes  Additional Notes  VSS.  [facility] RN monitoring vitals throughout procedure.  Patient tolerated procedure well.  30 cc 0.5% bupiv with 1:400 k epi injected incrementally after neg asp

## 2020-05-18 NOTE — DISCHARGE INSTRUCTIONS
Recovery After Procedural Sedation (Adult)  You have been given medicine by vein to make you sleep during your surgery. This may have included both a pain medicine and sleeping medicine. Most of the effects have worn off. But you may still have some drowsiness for the next 6 to 8 hours.    Home care  Follow these guidelines when you get home:  · For the next 8 hours, you should be watched by a responsible adult. This person should make sure your condition is not getting worse.  · Don't drink any alcohol for the next 24 hours.  · Don't drive, operate dangerous machinery, or make important business or personal decisions during the next 24 hours.  Note: Your healthcare provider may tell you not to take any medicine by mouth for pain or sleep in the next 4 hours. These medicines may react with the medicines you were given in the hospital. This could cause a much stronger response than usual.    Follow-up care  Follow up with your healthcare provider if you are not alert and back to your usual level of activity within 12 hours.    When to seek medical advice  Call your healthcare provider right away if any of these occur:  · Drowsiness gets worse  · Weakness or dizziness gets worse  · Repeated vomiting  · You can't be awakened     Date Last Reviewed: 10/18/2016  © 6319-0215 [employer]. [address] [place] [address], [place] [address]. All rights reserved. This information is not intended as a substitute for professional medical care. Always follow your healthcare professional's instructions.    Discharge Instructions: After Your Surgery  You've just had surgery. During surgery, you were given medicine called anesthesia to keep you relaxed and free of pain. After surgery, you may have some pain or nausea. This is common. Here are some tips for feeling better and getting well after surgery.    Stay on schedule with your medicine.   Going home  Your healthcare provider will show you how to take care of yourself when  you go home. He or she will also answer your questions. Have an adult family member or friend drive you home. For the first 24 hours after your surgery:  · Have someone stay with you, if needed. He or she can watch for problems and help keep you safe.  Be sure to go to all follow-up visits with your healthcare provider. And rest after your surgery for as long as your healthcare provider tells you to.    Coping with pain  If you have pain after surgery, pain medicine will help you feel better. Take it as told, before pain becomes severe. Also, ask your healthcare provider or pharmacist about other ways to control pain. This might be with heat, ice, or relaxation. And follow any other instructions your surgeon or nurse gives you.    Tips for taking pain medicine  To get the best relief possible, remember these points:  · Pain medicines can upset your stomach. Taking them with a little food may help.  · Most pain relievers taken by mouth need at least 20 to 30 minutes to start to work.  · Taking medicine on a schedule can help you remember to take it. Try to time your medicine so that you can take it before starting an activity. This might be before you get dressed, go for a walk, or sit down for dinner.  · Constipation is a common side effect of pain medicines. Call your healthcare provider before taking any medicines such as laxatives or stool softeners to help ease constipation. Also ask if you should skip any foods. Drinking lots of fluids and eating foods such as fruits and vegetables that are high in fiber can also help. Remember, do not take laxatives unless your surgeon has prescribed them.    Your healthcare provider may tell you to take acetaminophen to help ease your pain. Ask him or her how much you are supposed to take each day. Acetaminophen or other pain relievers may interact with your prescription medicines or other over-the-counter (OTC) medicines. Some prescription medicines have acetaminophen and  other ingredients. Using both prescription and OTC acetaminophen for pain can cause you to overdose. Read the labels on your OTC medicines with care. This will help you to clearly know the list of ingredients, how much to take, and any warnings. It may also help you not take too much acetaminophen. If you have questions or do not understand the information, ask your pharmacist or healthcare provider to explain it to you before you take the OTC medicine.    Managing nausea  Some people have an upset stomach after surgery. This is often because of anesthesia, pain, or pain medicine, or the stress of surgery. These tips will help you handle nausea and eat healthy foods as you get better. If you were on a special food plan before surgery, ask your healthcare provider if you should follow it while you get better. These tips may help:  · Do not push yourself to eat. Your body will tell you when to eat and how much.  · Start off with clear liquids and soup. They are easier to digest.  · Next try semi-solid foods, such as mashed potatoes, applesauce, and gelatin, as you feel ready.  · Slowly move to solid foods. Don't eat fatty, rich, or spicy foods at first.  · Do not force yourself to have 3 large meals a day. Instead eat smaller amounts more often.  · Take pain medicines with a small amount of solid food, such as crackers or toast, to avoid nausea.     Call your surgeon if  · You still have pain an hour after taking medicine. The medicine may not be strong enough.  · You feel too sleepy, dizzy, or groggy. The medicine may be too strong.  · You have side effects like nausea, vomiting, or skin changes, such as rash, itching, or hives.       If you have obstructive sleep apnea  You were given anesthesia medicine during surgery to keep you comfortable and free of pain. After surgery, you may have more apnea spells because of this medicine and other medicines you were given. The spells may last longer than usual.   At  home:  · Keep using the continuous positive airway pressure (CPAP) device when you sleep. Unless your healthcare provider tells you not to, use it when you sleep, day or night. CPAP is a common device used to treat obstructive sleep apnea.  · Talk with your provider before taking any pain medicine, muscle relaxants, or sedatives. Your provider will tell you about the possible dangers of taking these medicines.  Date Last Reviewed: 12/1/2016  © 0656-8447 [employer]. [address] [place] [address]. All rights reserved. This information is not intended as a substitute for professional medical care. Always follow your healthcare professional's instructions.          PATIENT INSTRUCTIONS  POST-ANESTHESIA    IMMEDIATELY FOLLOWING SURGERY:  Do not drive or operate machinery for the first twenty four hours after surgery.  Do not make any important decisions for twenty four hours after surgery or while taking narcotic pain medications or sedatives.  If you develop intractable nausea and vomiting or a severe headache please notify your doctor immediately.    FOLLOW-UP:  Please make an appointment with your surgeon as instructed. You do not need to follow up with anesthesia unless specifically instructed to do so.    WOUND CARE INSTRUCTIONS (if applicable):  Keep a dry clean dressing on the anesthesia/puncture wound site if there is drainage.  Once the wound has quit draining you may leave it open to air.  Generally you should leave the bandage intact for twenty four hours unless there is drainage.  If the epidural site drains for more than 36-48 hours please call the anesthesia department.    QUESTIONS?:  Please feel free to call your physician or the hospital  if you have any questions, and they will be happy to assist you.       [facility] Anesthesia Department  [address] [facility]  [phone]      Wound Check After Surgery: Bleeding  Surgery involves cutting through  layers of skin, fatty tissue, muscle, and sometimes bone and cartilage. Sutures (stitches) or staples are used to close all layers of the wound. The sutures on the inside will dissolve in about 2 to 3 weeks. Any sutures or staples used on the outside need to be removed in about 7 to 14 days, depending on the location.  It is normal to have some clear or bloody discharge on the wound covering or bandage (dressing) for the first few days after surgery. If your wound was sutured (sewn) closed, you should not have to change the dressing more than three times a day in the first few days. Bleeding or discharge requiring more frequent dressing changes can be a sign of a problem.    Home care  Different types of surgery require different types of care and dressing changes. It is important to follow all instructions and advice from your surgeon, as well as other members of your healthcare team.    Wound care  · Keep the wound clean, as directed by your healthcare provider.  · Change the dressing as directed. Change the dressing sooner if it becomes wet or stained with blood or fluid from the wound.  · Bathe with a sponge (no shower or tub baths) for the first few days after surgery, or until there is no more drainage from the wound. Unless you received different instructions from your surgeon, you can then shower. Do not soak the area in water (no baths or swimming) until the tape, sutures, or staples are removed and any wound opening has dried out and healed.    Changing the dressing  · Wash your hands before changing the dressings.  · Carefully remove the dressing and tape; don't just yank it off. If it sticks to the wound, you may need to wet it a little to remove it, unless your healthcare provider told you not to wet it.  · Wash your hands again before putting on a new, clean dressing.  · Gently clean the wound with clean water (or saline) using gauze or a clean washcloth. Do not rub it or pick at it.  · Do not use  soap, alcohol, hydrogen peroxide, or any other cleanser.  · If you were told to dry the wound before putting on a new dressing, gently pat it dry. Do not rub.  · Throw out the old dressing. Do not reuse it!  · Wash your hands again when you are done.    Types of dressings  Your healthcare team will tell you what type of dressing to put on your wound. Follow your healthcare team's instructions carefully, and contact them if you have any questions. Two common types of dressings are described below. You may have one of these or another type.  · Dry dressing. Use dry gauze. If the wound is still draining, use a nonadherent dressing, which shouldn't stick to the wound.  · Wet-to-dry dressing. Wet the gauze, and squeeze out the excess water (or saline), before putting it on. Then, cover this with a dry pad.    Medicines  · If you were given antibiotics, take them until they are used up or your healthcare provider tells you to stop. It is important to finish the antibiotics even though you feel better, to make sure the infection has cleared.  · You can take acetaminophen or ibuprofen for pain, unless you were given a different pain medicine to use. (Note: If you have chronic liver or kidney disease, or have ever had a stomach ulcer or gastrointestinal bleeding, or are taking blood thinner medicines, talk with your healthcare provider before using these medicines.)  · Aspirin should never be used in anyone under 18 years of age who is ill with a fever. It may cause severe liver damage.    Follow-up care  Follow up with your healthcare provider, or as advised, for your next wound check or removal of sutures, staples, or tape.  · If a culture was done, you will be notified if the results will affect your treatment. You can call as directed for the results.  · If imaging tests, such as X-rays, an ultrasound, or CT scan were done, they will be reviewed by a specialist. You will be notified of the results, especially if they  affect treatment.    Call 911  Call emergency services right away if any of these occur:  · Trouble breathing or swallowing, wheezing  · Hoarse voice or trouble speaking  · Extreme confusion  · Extreme drowsiness or trouble awakening  · Fainting or loss of consciousness  · Rapid heart rate or very slow heart rate  · Vomiting blood, or large amounts of blood in stool  · Discomfort in the center of the chest that feels like pressure, squeezing, a sense of fullness, or pain  · Discomfort or pain in other upper body areas, such as the back, one or both arms, neck, jaw, or stomach  · Stroke symptoms (spot a stroke FAST)  ¨ F: Face drooping. One side of the face is numb or droops.  ¨ A: Arm weakness. One arm feels weak or numb.  ¨ S: Speech difficulty: Speech is slurred, or the person is unable to speak.  ¨ T: Time to call 911. Even if symptoms go away, call 911.    When to seek medical advice  Call your healthcare provider right away if any of the following occur:  · Fluid or blood soaking 5 or more bandages a day during the first 3 days after surgery  · Fluid or blood still draining from the wound more than 3 days after surgery  · Increasing pain at the site of surgery  · Fever over 100.4º F (38.0º C)  · Redness around the wound  · Pus coming from the wound  · Vomiting, constipation, or diarrhea    Date Last Reviewed: 9/27/2015  © 7988-9519 [employer]. [address] [place] [address]. All rights reserved. This information is not intended as a substitute for professional medical care. Always follow your healthcare professional's instructions.      Wound Check After Surgery, No Complication    It is normal to feel pain at the incision site. The pain decreases as the wound heals. Most of the pain and soreness from the skin incision should go away by the time the sutures or staples are removed. Soreness and pain from deeper tissues may last another week or two.  Pain that continues more than a few  weeks after surgery or pain that worsens anytime after surgery can be a sign of a problem, such as:  · Infection  · Separation of wound edges  · Collection of blood or other below the skin        Date Last Reviewed: 9/27/2015  © 7928-1986 [employer]. [address] [place] [address]. All rights reserved. This information is not intended as a substitute for professional medical care. Always follow your healthcare professional's instructions.

## 2020-05-18 NOTE — OP NOTE
[facility]  Surgery Department  Operative Note    SUMMARY     Date of Procedure: 5/18/2020     Procedure: Procedure(s) (LRB):  OSTECTOMY, CARPAL BONE, RIGHT HOOK OF HAMATE EXCISION (Right)     Surgeon(s) and Role:     * Yesenia Quiroz MD - Primary    Assisting Surgeon: None    Pre-Operative Diagnosis: Closed displaced fracture of hook process of hamate of right wrist, initial encounter [S62.151A]    Post-Operative Diagnosis: Post-Op Diagnosis Codes:     * Closed displaced fracture of hook process of hamate of right wrist, initial encounter [S62.151A]    Anesthesia: Regional    Technical Procedures Used: surgery    Description of the Findings of the Procedure:  Indication for procedure Mr. Whitman is a 29-year-old male with history fracture of the hamate his right hand does cause significant pain to the patient he was set up originally for hook of the hamate excision before the COVID-19 pandemic is now here for excision of the hook of the hamate still consistent with pain at the palm risks and benefits were explained to the patient in clinic consents were signed in clinic    Procedure in detail the correct site was marked with the patient's participation the holding area the patient underwent regional seizure was brought to the operating room placed in supine position underwent MAC anesthesia well-padded nonsterile tourniquet was placed on the right upper extremity right upper extremity was prepped and draped normal sterile fashion time-out was conducted for the correct procedure to be indicated IV antibiotics were given patient preoperatively the incision which was parallel to the hypothenar crease was marked out the arm was exsanguinated with an Esmarch tourniquet insufflated 250 mmHg incision was made careful dissection down to the artery nerve they were both visualized dissected out and completely mobilized gently out of the way with the Ragnell retractors dissection was carried out to  the hook of the hamate all the while protecting the deep motor branch of the ulnar nerve it was gently retracted out away the periosteum was opened the hook of the hamate was removed with a rongeur once was completely removed this was confirmed on C-arm fluoroscopy rasp was used to smooth out the area the areas irrigated copious amounts normal saline and bone wax was placed multiple x-rays were taken to confirm the removal of the hook of the hamate nerve and artery placed back in position after the periosteum was repaired with Vicryl suture Vicryl nylon closed the skin sterile dressing was applied tourniquet was deflated brisk cap refill sued patient was placed in a well-padded splint tolerated suture was brought to cover area in stable condition    Postop plan place patient deep dressing clean dry and intact in 2 weeks time sutures are to be removed he will be placed in either a well-padded removable brace at 2 weeks or a padded glove he can start therapy at that time as indicated    Significant Surgical Tasks Conducted by the Assistant(s), if Applicable: retraction    Complications: No    Estimated Blood Loss (EBL): * No values recorded between 5/18/2020  9:00 AM and 5/18/2020 10:17 AM *           Implants: * No implants in log *    Specimens:   Specimen (12h ago, onward)    None                  Condition: Good    Disposition: PACU - hemodynamically stable.    Attestation: I performed the procedure.    Discharge Note    SUMMARY     Admit Date: 5/18/2020    Discharge Date and Time: 5/18/2020 11:11 AM    Hospital Course (synopsis of major diagnoses, care, treatment, and services provided during the course of the hospital stay): surgery     Final Diagnosis: Post-Op Diagnosis Codes:     * Closed displaced fracture of hook process of hamate of right wrist, initial encounter [S62.151A]    Disposition: Home or Self Care    Follow Up/Patient Instructions:     Medications:  Reconciled Home Medications:      Medication  List      CONTINUE taking these medications    cetirizine 10 MG tablet  Commonly known as:  ZYRTEC  Take 10 mg by mouth daily as needed for Allergies.     loratadine 10 mg tablet  Commonly known as:  CLARITIN  Take 10 mg by mouth daily as needed for Allergies.     oxyCODONE-acetaminophen 5-325 mg per tablet  Commonly known as:  PERCOCET  Take 1 tablet by mouth every 6 (six) hours as needed for Pain.          Discharge Procedure Orders   Call MD for:  temperature >100.4     Call MD for:  persistent nausea and vomiting or diarrhea     Call MD for:  redness, tenderness, or signs of infection (pain, swelling, redness, odor or green/yellow discharge around incision site)     Call MD for:  difficulty breathing or increased cough     Call MD for:  severe persistent headache     Call MD for:  worsening rash     Call MD for:  persistent dizziness, light-headedness, or visual disturbances     Call MD for:  increased confusion or weakness     Leave dressing on - Keep it clean, dry, and intact until clinic visit     Weight bearing restrictions (specify):   Order Comments: Nonweight bearing operative extremity. Okay to move fingers to prevent stiffness. Okay to type on computer.     Follow-up Information     JESSICA Bales. Go on 6/2/2020.    Specialties:  Hand Surgery, Orthopedic Surgery  Why:  For wound re-check;' 3:15 pm  Contact information:  [address]  [address] [id number] [phone]

## 2020-05-18 NOTE — BRIEF OP NOTE
[facility]  Brief Operative Note    Surgery Date: 5/18/2020     Surgeon(s) and Role:     * Yesenia Quiroz MD - Primary    Assisting Surgeon: Kavon Peñaloza MD    Pre-op Diagnosis:  Closed displaced fracture of hook process of hamate of right wrist, initial encounter [S62.151A]    Post-op Diagnosis:  Post-Op Diagnosis Codes:     * Closed displaced fracture of hook process of hamate of right wrist, initial encounter [S62.151A]    Procedure(s) (LRB):  OSTECTOMY, CARPAL BONE, RIGHT HOOK OF HAMATE EXCISION (Right)    Anesthesia: Regional    Description of the findings of the procedure(s): right hook of hamate excision         Specimens:   Specimen (12h ago, onward)    None            Discharge Note    OUTCOME: Patient tolerated treatment/procedure well without complication and is now ready for discharge.    DISPOSITION: Home or Self Care    FINAL DIAGNOSIS:  Closed displaced fracture of hook process of hamate    FOLLOWUP: In clinic    DISCHARGE INSTRUCTIONS:    Discharge Procedure Orders   Call MD for:  temperature >100.4     Call MD for:  persistent nausea and vomiting or diarrhea     Call MD for:  redness, tenderness, or signs of infection (pain, swelling, redness, odor or green/yellow discharge around incision site)     Call MD for:  difficulty breathing or increased cough     Call MD for:  severe persistent headache     Call MD for:  worsening rash     Call MD for:  persistent dizziness, light-headedness, or visual disturbances     Call MD for:  increased confusion or weakness     Leave dressing on - Keep it clean, dry, and intact until clinic visit     Weight bearing restrictions (specify):   Order Comments: Nonweight bearing operative extremity. Okay to move fingers to prevent stiffness. Okay to type on computer.

## 2020-05-18 NOTE — INTERVAL H&P NOTE
The patient has been examined and the H&P has been reviewed:    I concur with the findings and no changes have occurred since H&P was written.    Anesthesia/Surgery risks, benefits and alternative options discussed and understood by patient/family.          Active Hospital Problems    Diagnosis  POA    Closed displaced fracture of hook process of hamate [S62.153A]  Yes      Resolved Hospital Problems   No resolved problems to display.

## 2020-05-19 VITALS
DIASTOLIC BLOOD PRESSURE: 66 MMHG | TEMPERATURE: 97 F | OXYGEN SATURATION: 100 % | BODY MASS INDEX: 19.37 KG/M2 | SYSTOLIC BLOOD PRESSURE: 146 MMHG | RESPIRATION RATE: 16 BRPM | WEIGHT: 135 LBS | HEART RATE: 65 BPM

## 2020-05-25 ENCOUNTER — TELEPHONE (OUTPATIENT)
Dept: ORTHOPEDICS | Facility: CLINIC | Age: 30
End: 2020-05-25

## 2020-05-25 NOTE — TELEPHONE ENCOUNTER
Lm on vm advising pt to call office back to reschedule appt 6/2/2020 due to Maria Antonia will not be at [facility] on that day.

## 2020-05-28 ENCOUNTER — TELEPHONE (OUTPATIENT)
Dept: ORTHOPEDICS | Facility: CLINIC | Age: 30
End: 2020-05-28

## 2020-06-03 ENCOUNTER — OFFICE VISIT (OUTPATIENT)
Dept: ORTHOPEDICS | Facility: CLINIC | Age: 30
End: 2020-06-03
Payer: COMMERCIAL

## 2020-06-03 VITALS
BODY MASS INDEX: 19.33 KG/M2 | HEIGHT: 70 IN | WEIGHT: 135 LBS | SYSTOLIC BLOOD PRESSURE: 120 MMHG | DIASTOLIC BLOOD PRESSURE: 79 MMHG | HEART RATE: 66 BPM

## 2020-06-03 DIAGNOSIS — S62.151A CLOSED DISPLACED FRACTURE OF HOOK PROCESS OF HAMATE OF RIGHT WRIST, INITIAL ENCOUNTER: Primary | ICD-10-CM

## 2020-06-03 PROCEDURE — 99024 POSTOP FOLLOW-UP VISIT: CPT | Mod: S$GLB,,, | Performed by: PHYSICIAN ASSISTANT

## 2020-06-03 PROCEDURE — 99999 PR PBB SHADOW E&M-EST. PATIENT-LVL III: CPT | Mod: PBBFAC,,, | Performed by: PHYSICIAN ASSISTANT

## 2020-06-03 PROCEDURE — 97760 PR ORTHOTIC MGMT&TRAINJ INITIAL ENC EA 15 MINS: ICD-10-PCS | Mod: S$GLB,,, | Performed by: PHYSICIAN ASSISTANT

## 2020-06-03 PROCEDURE — 99999 PR PBB SHADOW E&M-EST. PATIENT-LVL III: ICD-10-PCS | Mod: PBBFAC,,, | Performed by: PHYSICIAN ASSISTANT

## 2020-06-03 PROCEDURE — 99024 PR POST-OP FOLLOW-UP VISIT: ICD-10-PCS | Mod: S$GLB,,, | Performed by: PHYSICIAN ASSISTANT

## 2020-06-03 PROCEDURE — 97760 ORTHOTIC MGMT&TRAING 1ST ENC: CPT | Mod: S$GLB,,, | Performed by: PHYSICIAN ASSISTANT

## 2020-06-03 NOTE — PROGRESS NOTES
"Mr. Whitman is here today for a post-operative visit.  He is 16 days status post right hook of the hamate excision by Dr. Quiroz on 5/18/2020. He reports that he is doing well.  Pain is improved, currently mild.  He is not taking pain medication.  He denies fever, chills, and sweats since the time of the surgery.     Physical exam:    Vitals:    06/03/20 1618   BP: 120/79   Pulse: 66   Weight: 61.2 kg (135 lb)   Height: 5' 10" (1.778 m)   PainSc: 0-No pain     Vital signs are stable, patient is afebrile.  Patient is well dressed and well groomed, no acute distress.  Alert and oriented to person, place, and time.  Post op dressing taken down.  Incision is clean, dry, and intact.  There is no erythema or exudate.  There is no sign of any infection. He is NVI. Sutures removed without difficulty.  Padded dressing and thumb spica brace    Assessment: 16 days status post right hook of the hamate excision    Plan:  Les was seen today for post-op evaluation.    Diagnoses and all orders for this visit:    Closed displaced fracture of hook process of hamate of right wrist, initial encounter  -     Ambulatory referral/consult to Physical/Occupational Therapy        - PO instruction reviewed and provided to patient  - thumb spica brace provided (15 minutes spent preparing, fitting, and educating on brace).  - No lifting or weight bearing  - OT orders  - Discussed scar massage  - Follow up in 4 weeks   - Call with questions or concerns      "

## 2020-06-10 ENCOUNTER — CLINICAL SUPPORT (OUTPATIENT)
Dept: REHABILITATION | Facility: HOSPITAL | Age: 30
End: 2020-06-10
Payer: COMMERCIAL

## 2020-06-10 DIAGNOSIS — M25.60 RANGE OF MOTION DEFICIT: ICD-10-CM

## 2020-06-10 DIAGNOSIS — M25.531 WRIST PAIN, RIGHT: ICD-10-CM

## 2020-06-10 DIAGNOSIS — R29.898 DECREASED GRIP STRENGTH OF RIGHT HAND: ICD-10-CM

## 2020-06-10 PROCEDURE — 97018 PARAFFIN BATH THERAPY: CPT | Mod: 59

## 2020-06-10 PROCEDURE — 97110 THERAPEUTIC EXERCISES: CPT

## 2020-06-10 PROCEDURE — 97165 OT EVAL LOW COMPLEX 30 MIN: CPT

## 2020-06-10 NOTE — PROGRESS NOTES
[facility] Occupational Therapy  Initial Hand Evaluation     Date: 6/10/2020  Name: Les Whitman III  Clinic Number: [id number]    Medical Diagnosis: R Hamate Excision 5/18/2020   Therapy Diagnosis:   Encounter Diagnoses   Name Primary?    Wrist pain, right     Decreased  strength of right hand     Range of motion deficit      Physician: Vikki Orozco PA    Physician Orders: eval and treat     Surgical Procedure and Date: 5/18/2020; osteotomy and hamate excision, right wrist   Evaluation Date: 6/10/2020    Plan of Care Certification Period: 8/10/2020  Date of Return to MD: 7/1/2020    Visit # / Visits authorized: 1 / 20  Insurance Authorization Period Expiration: 12/31/2020  FOTO - no     Time In:915 am   Time Out: 10 am   Total Billable Time: 45  minutes    Precautions:  Standard, non weightbearing     Subjective     Involved Side: right   Dominant Side: Right  Date of Onset: 5/18/2020   Mechanism of Injury: 1 year ago, march 2019; playing soccer   History of Current Condition:  Mr. Whitman is a 29-year-old male with history fracture of the hamate his right hand does cause significant pain to the patient he was set up originally for hook of the hamate excision before the COVID-19 pandemic is now here for excision of the hook of the hamate still consistent with pain at the palm  Imaging:  See chart   Previous Therapy: none     Past Medical History/Physical Systems Review:   Les Whitman III  has a past medical history of Allergies and GERD (gastroesophageal reflux disease).    Les Whitman III  has a past surgical history that includes none and Surgical removal of carpal bone (Right, 5/18/2020).    Les has a current medication list which includes the following prescription(s): cetirizine, loratadine, and oxycodone-acetaminophen.    Review of patient's allergies indicates:  No Known Allergies     Patient's Goals for Therapy: regain  strength, and hand strength      Pain:  Functional Pain Scale Rating 0-10:   2/10 on average  0/10 at best  2/10 at worst  Location: R wrist   Description: weakness, decreased    Aggravating Factors: lifting   Easing Factors: OTC thumb spica     Occupation:  [employer]   Working presently: employed  Duties: writing, typing, tool use     Functional Limitations/Social History:    Previous functional status includes: Independent with all ADLs.     Current FunctionalStatus   Home/Living environment : lives with an adult       Limitation of Functional Status as follows:   ADLs/IADLs:     - Feeding: IND     - Bathing/ Hygiene: IND     - Dressing/Grooming: tying shoes     - Driving: limited  for driving     - writing / typing limited for writing, typing    - /pinch limited for gripping, tool use, opening containers     - work activities: tool use      Leisure: weight lifting, exercising, soccer, bowling league         Objective     Observation/Appearance:  Joint stiffness       Sensation:   Intact     Wound/Scar:   3 CM VOLAR ULNAR SIDED PALM, healing well, no hypersensitivity reported      Edema. Measured in centimeters.   PPC 20.6 CM   PWC 16 CM     Hand ROM. Measured in degrees.     WRIST EXT/FLEX 58 / 40   RD/ UD 15 / 10   SUP/PRON WNL/WNL          Manual Muscle Testing   FCU 3-/5   ECU 3-/5     Special Tests:  NT         Strength (Dyanmometer)TBA   Measured in pounds . Average of three trials.   6/10/2020 6/10/2020    RIGHT LEFT    Rung II TBA  TBA    Key Pinch NT NT   3pt Pinch NT NT   2pt Pinch NT NT     No FOTO       Treatment     Treatment Time In: 935   Treatment Time Out: 10 AM   Total Treatment time separate from Evaluation time:25 MIN     Kaushik received the following supervised modalities after being cleared for contradictions for 10 minutes:   -Patient received paraffin bath to RIGHT  hand(s) for 10 minutes to increase blood flow, circulation, pain management and for tissue elasticity prior to therex.      Kaushik received therapeutic exercises for 15 minutes including:  -AROM for wrist flex, ext, RD, UD, composite fist with wrist flexion and prayer stretch x 10 reps each, 3-5 x daily   - reviewed scar massage x 5 min 1-2 x daily to decrease adhesions and improve tensile glide   - modality use for pain, stiffness, swelling     Home Exercise Program/Education:  Issued HEP (see patient instructions in EMR) and educated on  use of hot/cold modality use for pain, stiffness and edema management . Exercises were reviewed and Kaushik was able to demonstrate them prior to the end of the session.   Pt received a written copy of exercises to perform at home. Kaushik demonstrated good  understanding of the education provided.  Pt was advised to perform these exercises with minimal pain/discomfort of 3-4 out of 10 at worse, discontinue if pain worsens.    Patient/Family Education: role of OT, goals for OT, scheduling/cancellations - pt verbalized understanding. Discussed insurance limitations with patient.    Additional Education provided: per above    Assessment     Les Whitman III is a 29 y.o. year old referred to outpatient occupational therapy and presents with a medical diagnosis of RIGHT HAMATE EXCISION , resulting in Decreased ROM, Decreased  strength, Decreased muscle strength, Decreased functional hand use, Increased pain, Edema, Joint Stiffness and Scar Adhesions and demonstrates limitations as described in the chart below.   Following medical record review it is determined that pt will benefit from occupational therapy services in order to maximize pain free and/or functional use of right hand/UE   The following goals were discussed with the patient and patient is in agreement with them as to be addressed in the treatment plan. The patient's rehab potential is Good.     Anticipated barriers to occupational therapy: None   Pt has no cultural, educational or language barriers to learning provided.    Profile and  History Assessment of Occupational Performance Level of Clinical Decision Making Complexity Score   Occupational Profile:   Les Whitman III is a 29 y.o. male who lives with an adult  and is currently employed as Worker at [employer] . Les Whitman III has difficulty with    driving/transportation management, phone/computer use, housework/household chores and tying shoes, writing, typing, lifting, gripping, tool use, playing sports, bowling,   affecting his/her daily functional abilities. His/her main goal for therapy is regain strength in wrist/hand .     Comorbidities:   GERD , ALLERGIES     Medical and Therapy History Review:   Brief               Performance Deficits    Physical:  Joint Mobility  Joint Stability  Muscle Power/Strength  Muscle Endurance  Skin Integrity/Scar Formation  Edema  Control of Voluntary Movement   Strength  Gross Motor Coordination  Pain    Cognitive:  No Deficits    Psychosocial:    Habits  Routines     Clinical Decision Making:  low    Assessment Process:  Problem-Focused Assessments    Modification/Need for Assistance:  Not Necessary    Intervention Selection:  Limited Treatment Options       Low   Based on PMHX, co morbidities , data from assessments and functional level of assistance required with task and clinical presentation directly impacting function.       The following goals were discussed with the patient and patient is in agreement with them as to be addressed in the treatment plan.       Goals:   Short Term Goals (4 weeks)   1)  Patient to be IND with HEP and modalities for pain management  2)  Increase ROM 3-10 degrees to increase functional hand use for ADLs/work/leisure activities  3)   Decrease edema .1-.5 mm to increase joint mobility /flexibility for functional hand use.   4)  Assess   and set goals accordingly     Long Term Goals (8 weeks)   1)  Increase  strength 2-8 lbs. For bowling, tool use     2)  Increase ROM 11-20  degrees to increase functional hand use for ADLs/work/leisure activities      Plan   Recommend continued Outpatient Occupataional Therapy  1x week for 8 weeks to include the following interventions Paraffin, Manual therapy/joint mobilizations, Modalities for pain management, US 3 mhz, Therapeutic exercises/activities., Strengthening, Edema Control, Scar Management, Wound Care and Electrical Modalities.    Within the Certification Period/Plan of care expiration: 6/10/2020 to 8/10/2020 .    I certify the need for these services furnished under the plan of treatment and while under my care.     ____________________________________________________________________  Physician/Referring Practitioner   Date of Signature         Yamilex Anderson OT, CHT

## 2020-06-10 NOTE — PATIENT INSTRUCTIONS
[facility], OCCUPATIONAL THERAPY  HOME EXERCISE PROGRAM     Soak hand in hot water or use hot wet compress for 5-10 min before exercises     Cold pack x 10 min at night for pain/swelling as needed   Complete the following massages for 5 min each, 1-2x/day.                                            Complete the following exercises with 10 repetitions each, 3-5x/day.     AROM: Wrist Flexion / Extension               Bend your wrist forward and back as far as possible.      AROM: Wrist Radial / Ulnar Deviation  Bend your wrist from side to side as far as possible.    AROM: Wrist Flexion / Extension  Make a fist, then bend your wrist forward then back as far as possible.         AROM: Wrist Radial / Ulnar Deviation   Make a fist then bend your wrist toward your body, then away.                 Sitting with elbows on table and palms together, slowly lower wrists toward table until stretch is felt. Be sure to keep palms together throughout stretch. Hold 3-55 seconds. Relax.  Repeat 10 times. Do 3 sessions per day.  Copyright © I. All rights reserved.         Therapist: GABRIEL Yen CHT       Copyright © [facility]. All rights reserved.     Therapist: GABRIEL Yen CHT

## 2020-06-16 ENCOUNTER — CLINICAL SUPPORT (OUTPATIENT)
Dept: REHABILITATION | Facility: HOSPITAL | Age: 30
End: 2020-06-16
Payer: COMMERCIAL

## 2020-06-16 DIAGNOSIS — M25.60 RANGE OF MOTION DEFICIT: ICD-10-CM

## 2020-06-16 DIAGNOSIS — M25.531 WRIST PAIN, RIGHT: ICD-10-CM

## 2020-06-16 DIAGNOSIS — R29.898 DECREASED GRIP STRENGTH OF RIGHT HAND: ICD-10-CM

## 2020-06-16 PROCEDURE — 97140 MANUAL THERAPY 1/> REGIONS: CPT

## 2020-06-16 PROCEDURE — 97018 PARAFFIN BATH THERAPY: CPT | Mod: 59

## 2020-06-16 PROCEDURE — 97110 THERAPEUTIC EXERCISES: CPT

## 2020-06-16 NOTE — PROGRESS NOTES
"  Occupational Therapy Daily Treatment Note     Date: 6/16/2020  Name: Les Whitman III  Clinic Number: [id number]    Medical Diagnosis: R Hamate Excision 5/18/2020   Therapy Diagnosis:        Encounter Diagnoses   Name Primary?    Wrist pain, right      Decreased  strength of right hand      Range of motion deficit       Physician: Vikki Orozco PA     Physician Orders: eval and treat      Surgical Procedure and Date: 5/18/2020; osteotomy and hamate excision, right wrist   Evaluation Date: 6/10/2020     Plan of Care Certification Period: 8/10/2020  Date of Return to MD: 7/1/2020     Visit # / Visits authorized: 2 / 20  Insurance Authorization Period Expiration: 12/31/2020  FOTO - no      Time In:715 am   Time Out: 8 am   Total Billable Time: 45  minutes     Precautions:  Standard, non weightbearing     Subjective     Pt reports: "I went jogging and it was ok, swelled a little but otherwise motion is better.  I'm doing better with writing."   he was compliant with home exercise program given last session.   Response to previous treatment:more motion  Functional change: able to jog , writing improved     Pain: 2/10  Location: right wrist       Assess /pinch next session  Objective   Kaushik received the following supervised modalities after being cleared for contradictions for 10 minutes:   -Patient received paraffin bath to RIGHT  hand(s) for 10 minutes to increase blood flow, circulation, pain management and for tissue elasticity prior to therex.      Kaushik received therapeutic exercises for 20 minutes including:  -AROM for wrist flex, ext, RD, UD, composite fist with wrist flexion and prayer stretch x 10 reps each, 3-5 x daily   - wrist dexterciser x 2-3 min for wrist rotation   - unweighted dowel for wrist circumduction x 10 reps CW/CCW   - yellow rubberband for digit abduction , thumb abduction (palmar/radial) , thumb extension x 10 reps each   - red rubberband for composite digit extension x 10 reps "   - double yellow digi extender for EDC Strengthening x 10 reps   - provided red/yellow rubberband for extensor strengthening at home 2x daily   - modality use for pain, stiffness, swelling     Kaushik received the following manual therapy techniques for 15 minutes:   -scar massage with massage stick and mini massager to decrease adhesions and improve tensile glide   - mepitac scar pad x 2 for home use.     Home Exercises and Education Provided     Education provided:   - Cont HEP   - added yellow/red rubberband for EDC , EPL/B strengthening   - Progress towards goals     Written Home Exercises Provided: Patient instructed to cont prior HEP.  Exercises were reviewed and Kaushik was able to demonstrate them prior to the end of the session.  Kaushik demonstrated good  understanding of the HEP provided.   .   See EMR under Patient Instructions for exercises provided prior visit.      Assessment     Kaushik is progressing well towards his goals and there are no updates to goals at this time. Pt prognosis is Excellent.     Wrist ROM improving; Wrist flexion remains slightly limited at end range.  No significant pain complaints this session.  Will progress with /pinch and wrist strengthening next session.  Initiated extensor tendon strengthening this date with no complaints.  Pt will continue to benefit from skilled outpatient occupational therapy to address the deficits listed in the problem list on initial evaluation to improve ROM, strength, pain management, /pinch strength, functional use, scar and edema management and to maximize pt's level of independence with ADLs in the home, work  and community environment.     Anticipated barriers to occupational therapy: None     Pt's spiritual, cultural and educational needs considered and pt agreeable to plan of care and goals.    The following goals were discussed with the patient and patient is in agreement with them as to be addressed in the treatment plan.     Goals:   Short  Term Goals (4 weeks)   1)  Patient to be IND with HEP and modalities for pain management  2)  Increase ROM 3-10 degrees to increase functional hand use for ADLs/work/leisure activities  3)   Decrease edema .1-.5 mm to increase joint mobility /flexibility for functional hand use.   4)  Assess   and set goals accordingly      Long Term Goals (8 weeks)   1)  Increase  strength 2-8 lbs. For bowling, tool use     2)  Increase ROM 11-20 degrees to increase functional hand use for ADLs/work/leisure activities        Plan   Recommend continued Outpatient Occupataional Therapy  1x week for 8 weeks to include the following interventions Paraffin, Manual therapy/joint mobilizations, Modalities for pain management, US 3 mhz, Therapeutic exercises/activities., Strengthening, Edema Control, Scar Management, Wound Care and Electrical Modalities.     Within the Certification Period/Plan of care expiration: 6/10/2020 to 8/10/2020 .     Yamilex Anderson, OT, CHT

## 2020-06-23 ENCOUNTER — CLINICAL SUPPORT (OUTPATIENT)
Dept: REHABILITATION | Facility: HOSPITAL | Age: 30
End: 2020-06-23
Payer: COMMERCIAL

## 2020-06-23 DIAGNOSIS — M25.60 RANGE OF MOTION DEFICIT: ICD-10-CM

## 2020-06-23 DIAGNOSIS — R29.898 DECREASED GRIP STRENGTH OF RIGHT HAND: ICD-10-CM

## 2020-06-23 DIAGNOSIS — M25.531 WRIST PAIN, RIGHT: ICD-10-CM

## 2020-06-23 PROCEDURE — 97018 PARAFFIN BATH THERAPY: CPT | Mod: 59

## 2020-06-23 PROCEDURE — 97110 THERAPEUTIC EXERCISES: CPT

## 2020-06-23 PROCEDURE — 97140 MANUAL THERAPY 1/> REGIONS: CPT

## 2020-06-25 ENCOUNTER — TELEPHONE (OUTPATIENT)
Dept: ORTHOPEDICS | Facility: CLINIC | Age: 30
End: 2020-06-25

## 2020-07-01 ENCOUNTER — CLINICAL SUPPORT (OUTPATIENT)
Dept: REHABILITATION | Facility: HOSPITAL | Age: 30
End: 2020-07-01
Payer: COMMERCIAL

## 2020-07-01 ENCOUNTER — OFFICE VISIT (OUTPATIENT)
Dept: ORTHOPEDICS | Facility: CLINIC | Age: 30
End: 2020-07-01
Payer: COMMERCIAL

## 2020-07-01 VITALS
WEIGHT: 135 LBS | DIASTOLIC BLOOD PRESSURE: 77 MMHG | BODY MASS INDEX: 19.33 KG/M2 | SYSTOLIC BLOOD PRESSURE: 113 MMHG | HEART RATE: 71 BPM | HEIGHT: 70 IN

## 2020-07-01 DIAGNOSIS — M25.531 WRIST PAIN, RIGHT: ICD-10-CM

## 2020-07-01 DIAGNOSIS — S62.151A CLOSED DISPLACED FRACTURE OF HOOK PROCESS OF HAMATE OF RIGHT WRIST, INITIAL ENCOUNTER: Primary | ICD-10-CM

## 2020-07-01 DIAGNOSIS — Z47.89 ORTHOPEDIC AFTERCARE: ICD-10-CM

## 2020-07-01 DIAGNOSIS — R29.898 DECREASED GRIP STRENGTH OF RIGHT HAND: ICD-10-CM

## 2020-07-01 DIAGNOSIS — M25.60 RANGE OF MOTION DEFICIT: ICD-10-CM

## 2020-07-01 PROCEDURE — 97140 MANUAL THERAPY 1/> REGIONS: CPT

## 2020-07-01 PROCEDURE — 97018 PARAFFIN BATH THERAPY: CPT | Mod: 59

## 2020-07-01 PROCEDURE — 99999 PR PBB SHADOW E&M-EST. PATIENT-LVL III: ICD-10-PCS | Mod: PBBFAC,,, | Performed by: PHYSICIAN ASSISTANT

## 2020-07-01 PROCEDURE — 97110 THERAPEUTIC EXERCISES: CPT

## 2020-07-01 PROCEDURE — 99024 POSTOP FOLLOW-UP VISIT: CPT | Mod: S$GLB,,, | Performed by: PHYSICIAN ASSISTANT

## 2020-07-01 PROCEDURE — 99024 PR POST-OP FOLLOW-UP VISIT: ICD-10-PCS | Mod: S$GLB,,, | Performed by: PHYSICIAN ASSISTANT

## 2020-07-01 PROCEDURE — 99999 PR PBB SHADOW E&M-EST. PATIENT-LVL III: CPT | Mod: PBBFAC,,, | Performed by: PHYSICIAN ASSISTANT

## 2020-07-01 NOTE — PROGRESS NOTES
"Mr. Whitman is here today for a post-operative visit.  He is 44 days status post right hook of the hamate excision by Dr. Quiroz on 5/18/2020. He reports that he is doing well.  No current pain.  He is regularly attending OT and performing HEP and scar massage.  He is wearing a wrist support for work.  He denies fever, chills, and sweats since the time of the surgery.     Physical exam:    Vitals:    07/01/20 1533   BP: 113/77   Pulse: 71   Weight: 61.2 kg (135 lb)   Height: 5' 10" (1.778 m)   PainSc:   1     Vital signs are stable, patient is afebrile.  Patient is well dressed and well groomed, no acute distress.  Alert and oriented to person, place, and time.  Incision is healing well - clean, dry, and intact.  Thickening at the incision, mildly tender distally. There is no erythema or exudate.  There is no sign of any infection. He is NVI. Good wrist ROM, good finger ROM.    Assessment: 44 days status post right hook of the hamate excision    Plan:  Les was seen today for post-op evaluation.    Diagnoses and all orders for this visit:    Closed displaced fracture of hook process of hamate of right wrist, initial encounter    Orthopedic aftercare        - Continue regular scar massage  - wean out of wrist support  - OT and HEP, gradual strengthening  - Follow up as needed   - Call with questions or concerns        "

## 2020-07-01 NOTE — PROGRESS NOTES
"  Occupational Therapy Daily Treatment Note     Date: 7/1/2020  Name: Les Whitman III  Clinic Number: [id number]    Medical Diagnosis: R Hamate Excision 5/18/2020   Therapy Diagnosis:        Encounter Diagnoses   Name Primary?    Wrist pain, right      Decreased  strength of right hand      Range of motion deficit       Physician: Vikki Orozco PA     Physician Orders: eval and treat      Surgical Procedure and Date: 5/18/2020; osteotomy and hamate excision, right wrist   Evaluation Date: 6/10/2020     Plan of Care Certification Period: 8/10/2020  Date of Return to MD: 7/1/2020     Visit # / Visits authorized: 4 / 20  Insurance Authorization Period Expiration: 12/31/2020  FOTO - no      Time In: 215 pm  Time Out:3  Total Billable Time: 45  minutes     Precautions:  Standard, non weightbearing     RE-assess in 3 weeks  Subjective     Pt reports: "weightbearing is the only thing I'm having trouble with one spot in my palm but it feels better after I massage it"   he was compliant with home exercise program given last session.   Response to previous treatment:more motion  Functional change: able to jog , writing improved, not bowling yet     Pain: 2/10  Location: right wrist       Objective   Kaushik received the following supervised modalities after being cleared for contradictions for 10 minutes:   -Patient received paraffin bath to RIGHT  hand(s) for 10 minutes to increase blood flow, circulation, pain management and for tissue elasticity prior to therex.      Kaushik received therapeutic exercises for 20 minutes including:  -AROM for wrist flex, ext, RD, UD, composite fist with wrist flexion and prayer stretch x 10 reps each  - 1# hammer for wrist circumduction x 10 reps CW/CCW   - red  rubberband for digit abduction , thumb abduction (palmar/radial) , thumb extension x 10 reps each   - red rubberband for composite digit extension x 10 reps   -  - added   Red  theraputty for gross   And weightbearing " thru palm  x 10 reps   - 45 lbs. PHG for gross  x 20 reps  - green digiflex for isolated ring/small finger and composite flexion x 10 reps each   - added 3# wrist flex, ext, RD, UD 2x 10 reps each for wrist strengthening.   - modality use for pain, stiffness, swelling     Kaushik received the following manual therapy techniques for 15 minutes:   -scar massage with massage stick and hawks   to decrease adhesions and improve tensile glide   - silicone  scar pad x 2 for home use under wrist support       Re-assessed 7/1/2020 as follows:    R) 52 lbs avg (+4) ; 60 lb max    L) 98 lbs. avg     Edema. Measured in centimeters.   PPC 20.6 CM (=)  PWC 16 CM (=)     Hand ROM. Measured in degrees.      WRIST EXT/FLEX 70 (+12)  / 65 (+25)     Home Exercises and Education Provided     Education provided:   - Cont HEP   - added red theraputty for gross  and weightbearing   - added yellow/red rubberband for EDC , EPL/B strengthening   - Progress towards goals     Written Home Exercises Provided: Patient instructed to cont prior HEP.  Exercises were reviewed and Kaushik was able to demonstrate them prior to the end of the session.  Kaushik demonstrated good  understanding of the HEP provided.   .   See EMR under Patient Instructions for exercises provided prior visit. And this visit for  strengthening w/putty      Assessment     Kaushik is progressing well towards his goals and there are no updates to goals at this time. Pt prognosis is Excellent.     Wrist ROM improving; Wrist flexion remains slightly limited at end range.  No significant pain complaints this session.  .  Initiated  and wrist strengthening this date. Muscle fatigue noted following tx session but no pain complaints. Pt will continue to benefit from skilled outpatient occupational therapy to address the deficits listed in the problem list on initial evaluation to improve ROM, strength, pain management, /pinch strength, functional use, scar and edema  management and to maximize pt's level of independence with ADLs in the home, work  and community environment.     Anticipated barriers to occupational therapy: None     Pt's spiritual, cultural and educational needs considered and pt agreeable to plan of care and goals.    The following goals were discussed with the patient and patient is in agreement with them as to be addressed in the treatment plan.     Goals:   Short Term Goals (4 weeks)   1)  Patient to be IND with HEP and modalities for pain management  2)  Increase ROM 3-10 degrees to increase functional hand use for ADLs/work/leisure activities- met  3)   Decrease edema .1-.5 mm to increase joint mobility /flexibility for functional hand use.   4)  Assess   and set goals accordingly - met     Long Term Goals (8 weeks)   1)  Increase  strength 2-8 lbs. For bowling, tool use     2)  Increase ROM 11-20 degrees to increase functional hand use for ADLs/work/leisure activities- met        Plan   Recommend continued Outpatient Occupataional Therapy  1x week for 8 weeks to include the following interventions Paraffin, Manual therapy/joint mobilizations, Modalities for pain management, US 3 mhz, Therapeutic exercises/activities., Strengthening, Edema Control, Scar Management, Wound Care and Electrical Modalities.     Within the Certification Period/Plan of care expiration: 6/10/2020 to 8/10/2020 .     Yamilex Anderson OT, CHT

## 2020-08-03 ENCOUNTER — CLINICAL SUPPORT (OUTPATIENT)
Dept: REHABILITATION | Facility: HOSPITAL | Age: 30
End: 2020-08-03
Payer: COMMERCIAL

## 2020-08-03 DIAGNOSIS — M25.60 RANGE OF MOTION DEFICIT: ICD-10-CM

## 2020-08-03 DIAGNOSIS — M25.531 WRIST PAIN, RIGHT: ICD-10-CM

## 2020-08-03 DIAGNOSIS — R29.898 DECREASED GRIP STRENGTH OF RIGHT HAND: ICD-10-CM

## 2020-08-03 PROCEDURE — 97018 PARAFFIN BATH THERAPY: CPT | Mod: 59

## 2020-08-03 PROCEDURE — 97110 THERAPEUTIC EXERCISES: CPT

## 2020-08-03 PROCEDURE — 97140 MANUAL THERAPY 1/> REGIONS: CPT

## 2020-08-03 NOTE — PROGRESS NOTES
"  Occupational Therapy Daily Treatment Note and Discharge Summary      Date: 8/3/2020  Name: Les Whitman III  Clinic Number: [id number]    Medical Diagnosis: R Hamate Excision 5/18/2020   Therapy Diagnosis:        Encounter Diagnoses   Name Primary?    Wrist pain, right      Decreased  strength of right hand      Range of motion deficit       Physician: Vikki Orozco PA     Physician Orders: eval and treat      Surgical Procedure and Date: 5/18/2020; osteotomy and hamate excision, right wrist   Evaluation Date: 6/10/2020     Plan of Care Certification Period: 8/10/2020  Date of Return to MD: n/a      Visit # / Visits authorized: 5 / 20  Insurance Authorization Period Expiration: 12/31/2020  FOTO - no      Time In: 330 pm  Time Out:415  Total Billable Time: 45  minutes     Precautions:  Standard, non weightbearing       Subjective     Pt reports: "I can do wall pushups but not full pushups, still swells just a little there; No difficulty with work activities. I do 5# weights at home, I think strength is good.  I still haven't tried bowling yet. "    he was compliant with home exercise program given last session.   Response to previous treatment:more motion  Functional change: able to jog , writing improved, not bowling yet     Pain: 2/10  Location: right wrist       Objective   Kaushik received the following supervised modalities after being cleared for contradictions for 10 minutes:   -Patient received paraffin bath to RIGHT  hand(s) for 10 minutes to increase blood flow, circulation, pain management and for tissue elasticity prior to therex.      Kaushik received therapeutic exercises for 25 minutes including:  -AROM for wrist flex, ext, RD, UD, composite fist with wrist flexion and prayer stretch x 10 reps each  - 1# hammer for wrist circumduction x 10 reps CW/CCW   - green  rubberband for digit abduction , thumb abduction (palmar/radial) , thumb extension x 10 reps each   - green  rubberband for composite " digit extension x 10 reps   -  - added   Green   theraputty for gross   And weightbearing thru palm  x 10 reps   - 45 lbs. PHG for gross  x 20 reps  - green digiflex for isolated ring/small finger and composite flexion x 10 reps each   - added 4# wrist flex, ext, RD, UD 2x 10 reps each for wrist strengthening.   - modality use for pain, stiffness, swelling   - green putty for HEP with green rubberband to upgrade HEP     Kaushik received the following manual therapy techniques for 10 minutes:   -scar massage with massage stick and hawks   to decrease adhesions and improve tensile glide         Discharge status as follows:     R) 80 lbs avg (+32) ; 95 lb max  (+35)   L) 98 lbs. avg     Edema. Measured in centimeters.   PPC 20.0 CM (-0.6)  PWC 15.5 CM (-0.5)     Hand ROM. Measured in degrees.      WRIST EXT/FLEX 70 (+12)  / 65 (+25)     Home Exercises and Education Provided     Education provided:   - Cont HEP   - added red theraputty for gross  and weightbearing   - added yellow/red rubberband for EDC , EPL/B strengthening   - Progress towards goals     Written Home Exercises Provided: Patient instructed to cont prior HEP.  Exercises were reviewed and Kaushik was able to demonstrate them prior to the end of the session.  Kaushik demonstrated good  understanding of the HEP provided.   .   See EMR under Patient Instructions for exercises provided prior visit. And this visit for  strengthening w/putty      Assessment     Kaushik is progressing well towards his goals and there are no updates to goals at this time. Pt prognosis is Excellent.     Wrist ROM wnls.  Significant increase in  strength and decreased edema noted.  No significant pain complaints this session. Tolerated upgrade with no complaints; progressing with weightbearing activities as tolerated.  Patient has met all goals set upon initial evaluation. Discussed DC plans with patient in agreement. Patient demonstrated IND with HEP this date in  clinic.     Anticipated barriers to occupational therapy: None     Pt's spiritual, cultural and educational needs considered and pt agreeable to plan of care and goals.    The following goals were discussed with the patient and patient is in agreement with them as to be addressed in the treatment plan.     Goals:   Short Term Goals (4 weeks)   1)  Patient to be IND with HEP and modalities for pain management- met  2)  Increase ROM 3-10 degrees to increase functional hand use for ADLs/work/leisure activities- met  3)   Decrease edema .1-.5 mm to increase joint mobility /flexibility for functional hand use. - met  4)  Assess   and set goals accordingly - met     Long Term Goals (8 weeks)   1)  Increase  strength 2-8 lbs. For bowling, tool use   - met  2)  Increase ROM 11-20 degrees to increase functional hand use for ADLs/work/leisure activities- met        Plan   Will recommend discharge at this time with HEP with all goals met and patient in agreement.  Patient has met 6 of 6 goals set upon initial evaluation in 5 treatment sessions.         Yamilex Anderson, OT, CHT

## 2020-08-14 ENCOUNTER — TELEPHONE (OUTPATIENT)
Dept: INTERNAL MEDICINE | Facility: CLINIC | Age: 30
End: 2020-08-14

## 2020-08-14 ENCOUNTER — OFFICE VISIT (OUTPATIENT)
Dept: INTERNAL MEDICINE | Facility: CLINIC | Age: 30
End: 2020-08-14
Attending: INTERNAL MEDICINE
Payer: COMMERCIAL

## 2020-08-14 VITALS
HEART RATE: 78 BPM | DIASTOLIC BLOOD PRESSURE: 80 MMHG | BODY MASS INDEX: 17.93 KG/M2 | HEIGHT: 70 IN | WEIGHT: 125.25 LBS | OXYGEN SATURATION: 99 % | SYSTOLIC BLOOD PRESSURE: 128 MMHG

## 2020-08-14 DIAGNOSIS — R42 DIZZINESS: ICD-10-CM

## 2020-08-14 DIAGNOSIS — J45.21 MILD INTERMITTENT ASTHMA WITH ACUTE EXACERBATION: Primary | ICD-10-CM

## 2020-08-14 DIAGNOSIS — R01.1 HEART MURMUR: ICD-10-CM

## 2020-08-14 PROCEDURE — 99999 PR PBB SHADOW E&M-EST. PATIENT-LVL III: ICD-10-PCS | Mod: PBBFAC,,, | Performed by: INTERNAL MEDICINE

## 2020-08-14 PROCEDURE — 3008F PR BODY MASS INDEX (BMI) DOCUMENTED: ICD-10-PCS | Mod: CPTII,S$GLB,, | Performed by: INTERNAL MEDICINE

## 2020-08-14 PROCEDURE — 3008F BODY MASS INDEX DOCD: CPT | Mod: CPTII,S$GLB,, | Performed by: INTERNAL MEDICINE

## 2020-08-14 PROCEDURE — 99999 PR PBB SHADOW E&M-EST. PATIENT-LVL III: CPT | Mod: PBBFAC,,, | Performed by: INTERNAL MEDICINE

## 2020-08-14 PROCEDURE — 99204 PR OFFICE/OUTPT VISIT, NEW, LEVL IV, 45-59 MIN: ICD-10-PCS | Mod: S$GLB,,, | Performed by: INTERNAL MEDICINE

## 2020-08-14 PROCEDURE — 99204 OFFICE O/P NEW MOD 45 MIN: CPT | Mod: S$GLB,,, | Performed by: INTERNAL MEDICINE

## 2020-08-14 RX ORDER — ALBUTEROL SULFATE 0.83 MG/ML
2.5 SOLUTION RESPIRATORY (INHALATION)
Status: SHIPPED | OUTPATIENT
Start: 2020-08-14

## 2020-08-14 RX ORDER — ALBUTEROL SULFATE 90 UG/1
2 AEROSOL, METERED RESPIRATORY (INHALATION) EVERY 6 HOURS PRN
Qty: 18 G | Refills: 1 | Status: SHIPPED | OUTPATIENT
Start: 2020-08-14 | End: 2023-01-25 | Stop reason: SDUPTHER

## 2020-08-14 NOTE — TELEPHONE ENCOUNTER
"Message received from Lizeth Roberts in the call center today at 10:18am:    "I have Kaushik Whitman mrn [phone] experiencing shortness of breath stating it's Asthma related wanting to come in today but requesting to see another provider. He is Dr. Berrios patient but he don't want to see him. Ph. [phone]"    Called patient to triage:    Patient complained of shortness of breath.  When did the shortness of breath start? Tuesday had asthma exacerbation episode  Is the shortness of breath: intermittent  Duration: still present   Associated symptoms: N/A  Shortness of breath improved by: resting at home  Shortness of breath worsens:  exertion and being outside  Do you sleep Sleeps with two pillows at night (long term practice)  Is the patient using inhalers as prescribed? He is not prescribed inhalers currently  Are you taking steroids? No  Patient feels this is attributed to decrease in cardio/gym time recently and allergies. Denies wheezing, rattling with breathing. He feels it is just harder to breathe. He had an Albuterol HFA about 10 years ago. He has not had anything for rescue since then. Scheduled appt for patient with Dr. Holliday this afternoon. Patient advised not to start cardio just yet as this could further exacerbate his breathing issues. Advised patient to avoid inflammatory foods such as refined sugar, breads, and pasta.      "

## 2020-08-14 NOTE — PROGRESS NOTES
Subjective:       Patient ID: Les Whitman III is a 29 y.o. male.    Chief Complaint: Asthma    Here for urgent visit      3 days prior after sitting by friend who was smoking a cigarette and he developed chest tightness reminiscent of his childhood asthma and while walking inside shortly after onset developed a 1-2 minute episode of dizziness without LOC. He has had one other episode.     Chronic congestion, one nostril closed nightly, occasional sneezing, cleaning around house and dust makes his throat tickle.       Shortness of Breath  This is a recurrent problem. The current episode started in the past 7 days. The problem occurs intermittently. The problem has been unchanged. The average episode lasts 30 minutes. Associated symptoms include coryza. Pertinent negatives include no abdominal pain, chest pain, claudication, ear pain, fever, headaches, hemoptysis, leg pain, leg swelling, neck pain, orthopnea, PND, rash, rhinorrhea, sore throat, sputum production, swollen glands, syncope, vomiting or wheezing. The symptoms are aggravated by occupational exposure and weather changes. The patient has no known risk factors for DVT/PE. He has tried oral steroids and steroid inhalers for the symptoms. The treatment provided moderate relief. His past medical history is significant for allergies, asthma and a recent surgery. There is no history of aspirin allergies, bronchiolitis, CAD, chronic lung disease, COPD, DVT, a heart failure, PE or pneumonia.       Review of Systems   Constitutional: Negative for fever.   HENT: Negative for ear pain, rhinorrhea and sore throat.    Respiratory: Positive for shortness of breath. Negative for hemoptysis, sputum production and wheezing.    Cardiovascular: Negative for chest pain, orthopnea, claudication, leg swelling, syncope and PND.   Gastrointestinal: Negative for abdominal pain and vomiting.   Musculoskeletal: Negative for neck pain.   Skin: Negative for rash.   Neurological:  "Negative for headaches.       Objective:      Vitals:    08/14/20 1456   BP: 128/80   Pulse: 78   SpO2: 99%   Weight: 56.8 kg (125 lb 3.5 oz)   Height: 5' 10" (1.778 m)      Physical Exam  Constitutional:       General: He is not in acute distress.     Appearance: He is well-developed.   HENT:      Head: Normocephalic and atraumatic.      Mouth/Throat:      Pharynx: No oropharyngeal exudate.   Eyes:      General: No scleral icterus.     Conjunctiva/sclera: Conjunctivae normal.      Pupils: Pupils are equal, round, and reactive to light.   Neck:      Thyroid: No thyromegaly.   Cardiovascular:      Rate and Rhythm: Normal rate and regular rhythm.      Heart sounds: Normal heart sounds. No murmur.   Pulmonary:      Effort: Pulmonary effort is normal.      Breath sounds: Normal breath sounds. No wheezing or rales.   Abdominal:      General: There is no distension.      Palpations: Abdomen is soft.      Tenderness: There is no abdominal tenderness.   Musculoskeletal:         General: No tenderness.   Lymphadenopathy:      Cervical: No cervical adenopathy.   Skin:     General: Skin is warm and dry.   Neurological:      Mental Status: He is alert and oriented to person, place, and time.   Psychiatric:         Behavior: Behavior normal.         Assessment:       1. Mild intermittent asthma with acute exacerbation    2. Heart murmur    3. Dizziness        Plan:       Les was seen today for asthma.    Diagnoses and all orders for this visit:    Mild intermittent asthma with acute exacerbation   Mild to moderate improvement in chest tightness with nebulizer in clinic. Exam not very impressive in regards to wheezing. Concern murmur could be culprit and not response to beta agonist. Start with ECHO and   -     albuterol (PROVENTIL/VENTOLIN HFA) 90 mcg/actuation inhaler; Inhale 2 puffs into the lungs every 6 (six) hours as needed for Wheezing.    Heart murmur  -     Echo Color Flow Doppler? Yes  -     SCHEDULED EKG 12-LEAD (to " [place]); Future    Dizziness    Other orders  -     albuterol nebulizer solution 2.5 mg           Rubens Horne MD  Internal Medicine-[facility]        Side effects of medication(s) were discussed in detail and patient voiced understanding.  Patient will call back for any issues or complications.

## 2020-08-16 RX ORDER — PREDNISONE 10 MG/1
40 TABLET ORAL DAILY
Qty: 20 TABLET | Refills: 0 | Status: SHIPPED | OUTPATIENT
Start: 2020-08-16 | End: 2020-08-21

## 2020-08-17 ENCOUNTER — HOSPITAL ENCOUNTER (OUTPATIENT)
Dept: CARDIOLOGY | Facility: OTHER | Age: 30
Discharge: HOME | End: 2020-08-17
Attending: INTERNAL MEDICINE
Payer: COMMERCIAL

## 2020-08-17 VITALS
HEIGHT: 70 IN | BODY MASS INDEX: 17.9 KG/M2 | SYSTOLIC BLOOD PRESSURE: 128 MMHG | WEIGHT: 125 LBS | DIASTOLIC BLOOD PRESSURE: 80 MMHG

## 2020-08-17 LAB
ASCENDING AORTA: 2.46 CM
AV INDEX (PROSTH): 1.12
AV MEAN GRADIENT: 1 MMHG
AV PEAK GRADIENT: 2 MMHG
AV VALVE AREA: 3.21 CM2
AV VELOCITY RATIO: 1.27
BSA FOR ECHO PROCEDURE: 1.67 M2
CV ECHO LV RWT: 0.3 CM
DOP CALC AO PEAK VEL: 0.73 M/S
DOP CALC AO VTI: 17.64 CM
DOP CALC LVOT AREA: 2.9 CM2
DOP CALC LVOT DIAMETER: 1.91 CM
DOP CALC LVOT PEAK VEL: 0.93 M/S
DOP CALC LVOT STROKE VOLUME: 56.7 CM3
DOP CALCLVOT PEAK VEL VTI: 19.8 CM
E WAVE DECELERATION TIME: 155.85 MSEC
E/A RATIO: 1.55
E/E' RATIO: 4.12 M/S
ECHO LV POSTERIOR WALL: 0.66 CM (ref 0.6–1.1)
FRACTIONAL SHORTENING: 26 % (ref 28–44)
INTERVENTRICULAR SEPTUM: 0.79 CM (ref 0.6–1.1)
IVRT: 103.81 MSEC
LA MAJOR: 4.13 CM
LA MINOR: 4.17 CM
LA WIDTH: 3.63 CM
LEFT ATRIUM SIZE: 3.15 CM
LEFT ATRIUM VOLUME INDEX MOD: 19.9 ML/M2
LEFT ATRIUM VOLUME INDEX: 23.6 ML/M2
LEFT ATRIUM VOLUME MOD: 34 CM3
LEFT ATRIUM VOLUME: 40.33 CM3
LEFT INTERNAL DIMENSION IN SYSTOLE: 3.28 CM (ref 2.1–4)
LEFT VENTRICLE DIASTOLIC VOLUME INDEX: 53.05 ML/M2
LEFT VENTRICLE DIASTOLIC VOLUME: 90.68 ML
LEFT VENTRICLE MASS INDEX: 58 G/M2
LEFT VENTRICLE SYSTOLIC VOLUME INDEX: 25.5 ML/M2
LEFT VENTRICLE SYSTOLIC VOLUME: 43.6 ML
LEFT VENTRICULAR INTERNAL DIMENSION IN DIASTOLE: 4.46 CM (ref 3.5–6)
LEFT VENTRICULAR MASS: 98.53 G
LV LATERAL E/E' RATIO: 3.78 M/S
LV SEPTAL E/E' RATIO: 4.53 M/S
MV PEAK A VEL: 0.44 M/S
MV PEAK E VEL: 0.68 M/S
MV STENOSIS PRESSURE HALF TIME: 45.2 MS
MV VALVE AREA P 1/2 METHOD: 4.87 CM2
PISA TR MAX VEL: 2 M/S
PULM VEIN S/D RATIO: 1.22
PV PEAK D VEL: 0.41 M/S
PV PEAK S VEL: 0.5 M/S
PV PEAK VELOCITY: 0.94 CM/S
RA MAJOR: 4.23 CM
RA PRESSURE: 3 MMHG
RIGHT VENTRICULAR END-DIASTOLIC DIMENSION: 2.68 CM
SINUS: 3.22 CM
STJ: 2.47 CM
TDI LATERAL: 0.18 M/S
TDI SEPTAL: 0.15 M/S
TDI: 0.17 M/S
TR MAX PG: 16 MMHG
TRICUSPID ANNULAR PLANE SYSTOLIC EXCURSION: 2.37 CM
TV REST PULMONARY ARTERY PRESSURE: 19 MMHG

## 2020-08-17 PROCEDURE — 93306 ECHO (CUPID ONLY): ICD-10-PCS | Mod: 26,,, | Performed by: INTERNAL MEDICINE

## 2020-08-17 PROCEDURE — 93306 TTE W/DOPPLER COMPLETE: CPT

## 2020-08-17 PROCEDURE — 93306 TTE W/DOPPLER COMPLETE: CPT | Mod: 26,,, | Performed by: INTERNAL MEDICINE

## 2020-08-19 ENCOUNTER — LAB VISIT (OUTPATIENT)
Dept: LAB | Facility: OTHER | Age: 30
End: 2020-08-19
Attending: INTERNAL MEDICINE
Payer: COMMERCIAL

## 2020-08-19 ENCOUNTER — OFFICE VISIT (OUTPATIENT)
Dept: CARDIOLOGY | Facility: CLINIC | Age: 30
End: 2020-08-19
Attending: INTERNAL MEDICINE
Payer: COMMERCIAL

## 2020-08-19 VITALS
DIASTOLIC BLOOD PRESSURE: 88 MMHG | BODY MASS INDEX: 17.61 KG/M2 | OXYGEN SATURATION: 99 % | WEIGHT: 123 LBS | HEIGHT: 70 IN | HEART RATE: 64 BPM | SYSTOLIC BLOOD PRESSURE: 130 MMHG

## 2020-08-19 DIAGNOSIS — R93.1 ABNORMAL ECHOCARDIOGRAM: ICD-10-CM

## 2020-08-19 DIAGNOSIS — I42.8 CARDIOMYOPATHY, NONISCHEMIC: ICD-10-CM

## 2020-08-19 LAB
ALBUMIN SERPL BCP-MCNC: 4.7 G/DL (ref 3.5–5.2)
ALP SERPL-CCNC: 75 U/L (ref 55–135)
ALT SERPL W/O P-5'-P-CCNC: 17 U/L (ref 10–44)
ANION GAP SERPL CALC-SCNC: 10 MMOL/L (ref 8–16)
AST SERPL-CCNC: 19 U/L (ref 10–40)
BASOPHILS # BLD AUTO: 0.02 K/UL (ref 0–0.2)
BASOPHILS NFR BLD: 0.3 % (ref 0–1.9)
BILIRUB SERPL-MCNC: 0.5 MG/DL (ref 0.1–1)
BUN SERPL-MCNC: 14 MG/DL (ref 6–20)
CALCIUM SERPL-MCNC: 10 MG/DL (ref 8.7–10.5)
CHLORIDE SERPL-SCNC: 104 MMOL/L (ref 95–110)
CO2 SERPL-SCNC: 25 MMOL/L (ref 23–29)
CREAT SERPL-MCNC: 1 MG/DL (ref 0.5–1.4)
DIFFERENTIAL METHOD: NORMAL
EOSINOPHIL # BLD AUTO: 0 K/UL (ref 0–0.5)
EOSINOPHIL NFR BLD: 0.5 % (ref 0–8)
ERYTHROCYTE [DISTWIDTH] IN BLOOD BY AUTOMATED COUNT: 11.6 % (ref 11.5–14.5)
EST. GFR  (AFRICAN AMERICAN): >60 ML/MIN/1.73 M^2
EST. GFR  (NON AFRICAN AMERICAN): >60 ML/MIN/1.73 M^2
GLUCOSE SERPL-MCNC: 87 MG/DL (ref 70–110)
HCT VFR BLD AUTO: 42.9 % (ref 40–54)
HGB BLD-MCNC: 14.7 G/DL (ref 14–18)
IMM GRANULOCYTES # BLD AUTO: 0.02 K/UL (ref 0–0.04)
IMM GRANULOCYTES NFR BLD AUTO: 0.3 % (ref 0–0.5)
LYMPHOCYTES # BLD AUTO: 2.6 K/UL (ref 1–4.8)
LYMPHOCYTES NFR BLD: 40 % (ref 18–48)
MCH RBC QN AUTO: 30.9 PG (ref 27–31)
MCHC RBC AUTO-ENTMCNC: 34.3 G/DL (ref 32–36)
MCV RBC AUTO: 90 FL (ref 82–98)
MONOCYTES # BLD AUTO: 0.7 K/UL (ref 0.3–1)
MONOCYTES NFR BLD: 10.2 % (ref 4–15)
NEUTROPHILS # BLD AUTO: 3.2 K/UL (ref 1.8–7.7)
NEUTROPHILS NFR BLD: 48.7 % (ref 38–73)
NRBC BLD-RTO: 0 /100 WBC
PLATELET # BLD AUTO: 190 K/UL (ref 150–350)
PMV BLD AUTO: 10.2 FL (ref 9.2–12.9)
POTASSIUM SERPL-SCNC: 5.1 MMOL/L (ref 3.5–5.1)
PROT SERPL-MCNC: 7.8 G/DL (ref 6–8.4)
RBC # BLD AUTO: 4.75 M/UL (ref 4.6–6.2)
SODIUM SERPL-SCNC: 139 MMOL/L (ref 136–145)
TSH SERPL DL<=0.005 MIU/L-ACNC: 2.83 UIU/ML (ref 0.4–4)
WBC # BLD AUTO: 6.55 K/UL (ref 3.9–12.7)

## 2020-08-19 PROCEDURE — 99999 PR PBB SHADOW E&M-EST. PATIENT-LVL III: ICD-10-PCS | Mod: PBBFAC,,, | Performed by: INTERNAL MEDICINE

## 2020-08-19 PROCEDURE — 85025 COMPLETE CBC W/AUTO DIFF WBC: CPT

## 2020-08-19 PROCEDURE — 99204 OFFICE O/P NEW MOD 45 MIN: CPT | Mod: S$GLB,,, | Performed by: INTERNAL MEDICINE

## 2020-08-19 PROCEDURE — 80053 COMPREHEN METABOLIC PANEL: CPT

## 2020-08-19 PROCEDURE — 3008F PR BODY MASS INDEX (BMI) DOCUMENTED: ICD-10-PCS | Mod: CPTII,S$GLB,, | Performed by: INTERNAL MEDICINE

## 2020-08-19 PROCEDURE — 3008F BODY MASS INDEX DOCD: CPT | Mod: CPTII,S$GLB,, | Performed by: INTERNAL MEDICINE

## 2020-08-19 PROCEDURE — 99204 PR OFFICE/OUTPT VISIT, NEW, LEVL IV, 45-59 MIN: ICD-10-PCS | Mod: S$GLB,,, | Performed by: INTERNAL MEDICINE

## 2020-08-19 PROCEDURE — 99999 PR PBB SHADOW E&M-EST. PATIENT-LVL III: CPT | Mod: PBBFAC,,, | Performed by: INTERNAL MEDICINE

## 2020-08-19 PROCEDURE — 36415 COLL VENOUS BLD VENIPUNCTURE: CPT

## 2020-08-19 PROCEDURE — 84443 ASSAY THYROID STIM HORMONE: CPT

## 2020-08-19 NOTE — PROGRESS NOTES
Cardiology Consult  8/19/2020  10:44 AM    Attending Cardiologist: Saroj Garcia M.D.  Primary Care Provider: Rubens Holliday MD  Chief Complaint/Reason For Consultation:  Abnormal echo      Problem list  Patient Active Problem List   Diagnosis    Closed displaced fracture of hook process of hamate    Wrist pain, right    Decreased  strength of right hand    Range of motion deficit    Abnormal echocardiogram       CC:  Abnormal echo    HPI:  Les Whitman III is a 30 y.o.year-old male without past medical history who was referred for abnormal echocardiogram.  He is normally very active and exercises regularly and plays soccer.  Last week he was planning on going bike riding but did not feel well and canceled.  He stated that he will was short of breath.  He described this as having had to take deep breaths.  He denies any fever, chills, nausea vomiting or diaphoresis.  He denies any sick contact.  Three months ago, he had wrist surgery for fracture sustained during a fall playing soccer.  His COVID test was negative in May.  He denies any family history for premature coronary disease.  He does not smoke, abuse alcohol or caffeine.  He denies any chest pain, palpitation or syncope.    Medications  Current Outpatient Medications   Medication Sig Dispense Refill    albuterol (PROVENTIL/VENTOLIN HFA) 90 mcg/actuation inhaler Inhale 2 puffs into the lungs every 6 (six) hours as needed for Wheezing. 18 g 1    predniSONE (DELTASONE) 10 MG tablet Take 4 tablets (40 mg total) by mouth once daily. for 5 days 20 tablet 0    cetirizine (ZYRTEC) 10 MG tablet Take 10 mg by mouth daily as needed for Allergies.      loratadine (CLARITIN) 10 mg tablet Take 10 mg by mouth daily as needed for Allergies.       Current Facility-Administered Medications   Medication Dose Route Frequency Provider Last Rate Last Dose    albuterol nebulizer solution 2.5 mg  2.5 mg Nebulization 1 time in Clinic/HOD Rubens PRIETO  MD Mg          Prior to Admission medications    Medication Sig Start Date End Date Taking? Authorizing Provider   albuterol (PROVENTIL/VENTOLIN HFA) 90 mcg/actuation inhaler Inhale 2 puffs into the lungs every 6 (six) hours as needed for Wheezing. 8/14/20  Yes Rubens Holliday MD   predniSONE (DELTASONE) 10 MG tablet Take 4 tablets (40 mg total) by mouth once daily. for 5 days 8/16/20 8/21/20 Yes Rubens Holliday MD   cetirizine (ZYRTEC) 10 MG tablet Take 10 mg by mouth daily as needed for Allergies.    Historical Provider, MD   loratadine (CLARITIN) 10 mg tablet Take 10 mg by mouth daily as needed for Allergies.    Historical Provider, MD   oxyCODONE-acetaminophen (PERCOCET) 5-325 mg per tablet Take 1 tablet by mouth every 6 (six) hours as needed for Pain. 5/14/20 8/19/20  Kavon Peñaloza MD         History  Past Medical History:   Diagnosis Date    Allergies     GERD (gastroesophageal reflux disease)      Past Surgical History:   Procedure Laterality Date    none      SURGICAL REMOVAL OF CARPAL BONE Right 5/18/2020    Procedure: OSTECTOMY, CARPAL BONE, RIGHT HOOK OF HAMATE EXCISION;  Surgeon: Yesenia Quiroz MD;  Location: [facility];  Service: Orthopedics;  Laterality: Right;  REGIONAL/MAC     Social History     Socioeconomic History    Marital status: Single     Spouse name: Not on file    Number of children: Not on file    Years of education: Not on file    Highest education level: Not on file   Occupational History    Not on file   Social Needs    Financial resource strain: Not on file    Food insecurity     Worry: Not on file     Inability: Not on file    Transportation needs     Medical: Not on file     Non-medical: Not on file   Tobacco Use    Smoking status: Never Smoker    Smokeless tobacco: Never Used   Substance and Sexual Activity    Alcohol use: Yes     Alcohol/week: 3.0 standard drinks     Types: 3 Standard drinks or equivalent per week     Comment: 3 drinks  per week    Drug use: No    Sexual activity: Yes   Lifestyle    Physical activity     Days per week: Not on file     Minutes per session: Not on file    Stress: Not on file   Relationships    Social connections     Talks on phone: Not on file     Gets together: Not on file     Attends [employer] service: Not on file     Active member of club or organization: Not on file     Attends meetings of clubs or organizations: Not on file     Relationship status: Not on file   Other Topics Concern    Not on file   Social History Narrative    Not on file         Allergies  Review of patient's allergies indicates:  No Known Allergies      Review of Systems   Review of Systems   Constitution: Negative for decreased appetite, fever and weight loss.   HENT: Negative for congestion and nosebleeds.    Eyes: Negative for double vision, vision loss in left eye, vision loss in right eye and visual disturbance.   Cardiovascular: Negative for chest pain, claudication, cyanosis, dyspnea on exertion, irregular heartbeat, leg swelling, near-syncope, orthopnea, palpitations, paroxysmal nocturnal dyspnea and syncope.   Respiratory: Positive for shortness of breath. Negative for cough, hemoptysis, sleep disturbances due to breathing, snoring, sputum production and wheezing.    Endocrine: Negative for cold intolerance and heat intolerance.   Skin: Negative for nail changes and rash.   Musculoskeletal: Negative for joint pain, muscle cramps, muscle weakness and myalgias.   Gastrointestinal: Negative for change in bowel habit, heartburn, hematemesis, hematochezia, hemorrhoids and melena.   Neurological: Negative for dizziness, focal weakness and headaches.         Physical Exam  Wt Readings from Last 1 Encounters:   08/19/20 55.8 kg (123 lb)     BP Readings from Last 3 Encounters:   08/19/20 130/88   08/17/20 128/80   08/14/20 128/80     Pulse Readings from Last 1 Encounters:   08/19/20 64     Body mass index is 17.65 kg/m².    Physical  Exam   Constitutional: He is oriented to person, place, and time. He appears well-developed and well-nourished.   HENT:   Head: Atraumatic.   Eyes: EOM are normal. No scleral icterus.   Neck: Neck supple. Normal carotid pulses, no hepatojugular reflux and no JVD present. Carotid bruit is not present. No edema present.   Cardiovascular: Normal rate, regular rhythm, S1 normal, S2 normal, normal heart sounds and intact distal pulses. PMI is not displaced. Exam reveals no friction rub.   No murmur heard.  Pulses:       Carotid pulses are 2+ on the right side and 2+ on the left side.       Radial pulses are 2+ on the right side and 2+ on the left side.        Dorsalis pedis pulses are 2+ on the right side and 2+ on the left side.   Pulmonary/Chest: Effort normal and breath sounds normal. No stridor. No respiratory distress. He has no wheezes. He has no rales. He exhibits no tenderness.   Abdominal: Soft. Normal aorta and bowel sounds are normal.   Musculoskeletal:         General: No edema.   Neurological: He is alert and oriented to person, place, and time.   Skin: Skin is warm and dry. No cyanosis. Nails show no clubbing.   Psychiatric: He has a normal mood and affect. His behavior is normal. Thought content normal.   Vitals reviewed.                Assessment:  1.  Abnormal echo with WMA, preserved EF.  EKG is unrevealing.      Plan:  Get cardiac MRI to assess his abnormal Echo.    Follow up:  1 month    Time spent evaluating and treating patient 25 minutes with >50% of this time being face-to-face.     Saroj Garcia MD, F.A.C.C, F.S.C.A.I.

## 2020-08-19 NOTE — LETTER
August 19, 2020      Rubens Holliday MD  [address]  [address]  [facility] [address]           [facility]  [facility] [facility] [address]  [facility] [address]  Phone: [phone]  Fax: [phone]          Patient: Les Whitman III   MR Number: [id number]   YOB: 1990   Date of Visit: 8/19/2020       Dear Dr. Rubens Holliday:    Thank you for referring Les Whitman to me for evaluation. Attached you will find relevant portions of my assessment and plan of care.    If you have questions, please do not hesitate to call me. I look forward to following Les Whitman along with you.    Sincerely,    Saroj Garcia MD    Enclosure  CC:  No Recipients    If you would like to receive this communication electronically, please contact [email or web address] or [phone] to request more information on [employer] access.    For providers and/or their staff who would like to refer a patient to [facility], please contact us through our one-stop-shop provider referral line, [facility], at [phone].    If you feel you have received this communication in error or would no longer like to receive these types of communications, please e-mail [email or web address]

## 2020-09-10 DIAGNOSIS — R07.89 OTHER CHEST PAIN: Primary | ICD-10-CM

## 2020-09-10 NOTE — PROGRESS NOTES
Patient called today to report episode of chest pain which he was nervous about.  Chest pain occur while he was sitting at his desk.  Last week he resume full activities including exercising and stated that he felt good.  He did not have any exertional symptoms.  He is very worried because he could not scheduled a cardiac MRI to October.  He also has been having sensation that he cannot take a deep breath.  Will proceed with a treadmill stress test to evaluate his chest pain.

## 2020-09-21 ENCOUNTER — HOSPITAL ENCOUNTER (OUTPATIENT)
Dept: CARDIOLOGY | Facility: OTHER | Age: 30
Discharge: HOME | End: 2020-09-21
Attending: INTERNAL MEDICINE
Payer: COMMERCIAL

## 2020-09-21 VITALS
DIASTOLIC BLOOD PRESSURE: 77 MMHG | SYSTOLIC BLOOD PRESSURE: 130 MMHG | HEART RATE: 56 BPM | HEIGHT: 70 IN | BODY MASS INDEX: 17.61 KG/M2 | WEIGHT: 123 LBS

## 2020-09-21 DIAGNOSIS — R07.89 OTHER CHEST PAIN: ICD-10-CM

## 2020-09-21 LAB
CV STRESS BASE HR: 56 BPM
DIASTOLIC BLOOD PRESSURE: 77 MMHG
OHS CV CPX 1 MINUTE RECOVERY HEART RATE: 148 BPM
OHS CV CPX 85 PERCENT MAX PREDICTED HEART RATE MALE: 163
OHS CV CPX ESTIMATED METS: 20
OHS CV CPX MAX PREDICTED HEART RATE: 192
OHS CV CPX PATIENT IS FEMALE: 0
OHS CV CPX PATIENT IS MALE: 1
OHS CV CPX PEAK DIASTOLIC BLOOD PRESSURE: 82 MMHG
OHS CV CPX PEAK HEAR RATE: 181 BPM
OHS CV CPX PEAK RATE PRESSURE PRODUCT: NORMAL
OHS CV CPX PEAK SYSTOLIC BLOOD PRESSURE: 173 MMHG
OHS CV CPX PERCENT MAX PREDICTED HEART RATE ACHIEVED: 94
OHS CV CPX RATE PRESSURE PRODUCT PRESENTING: 7280
STRESS ANGINA INDEX: 0
STRESS DUKE TREADMILL SCORE: 18
STRESS ECHO POST EXERCISE DUR MIN: 18 MINUTES
STRESS ECHO POST EXERCISE DUR SEC: 1 SECONDS
STRESS ST DEPRESSION: 0 MM
STRESS ST ELEVATION: 0 MM
SYSTOLIC BLOOD PRESSURE: 130 MMHG

## 2020-09-21 PROCEDURE — 93018 CV STRESS TEST I&R ONLY: CPT | Mod: ,,, | Performed by: INTERNAL MEDICINE

## 2020-09-21 PROCEDURE — 93016 EXERCISE STRESS - EKG (CUPID ONLY): ICD-10-PCS | Mod: ,,, | Performed by: INTERNAL MEDICINE

## 2020-09-21 PROCEDURE — 93016 CV STRESS TEST SUPVJ ONLY: CPT | Mod: ,,, | Performed by: INTERNAL MEDICINE

## 2020-09-21 PROCEDURE — 93018 EXERCISE STRESS - EKG (CUPID ONLY): ICD-10-PCS | Mod: ,,, | Performed by: INTERNAL MEDICINE

## 2020-09-21 PROCEDURE — 93017 CV STRESS TEST TRACING ONLY: CPT

## 2020-10-27 ENCOUNTER — HOSPITAL ENCOUNTER (OUTPATIENT)
Dept: RADIOLOGY | Facility: HOSPITAL | Age: 30
Discharge: HOME | End: 2020-10-27
Attending: INTERNAL MEDICINE
Payer: COMMERCIAL

## 2020-10-27 DIAGNOSIS — I42.8 CARDIOMYOPATHY, NONISCHEMIC: ICD-10-CM

## 2020-10-27 PROCEDURE — 75561 CARDIAC MRI FOR MORPH W/DYE: CPT | Mod: 26,,, | Performed by: RADIOLOGY

## 2020-10-27 PROCEDURE — A9577 INJ MULTIHANCE: HCPCS | Performed by: INTERNAL MEDICINE

## 2020-10-27 PROCEDURE — 75561 CARDIAC MRI FOR MORPH W/DYE: CPT | Mod: TC

## 2020-10-27 PROCEDURE — 75561 MRI CARDIAC MORPHOLOGY FUNCTION W WO: ICD-10-PCS | Mod: 26,,, | Performed by: RADIOLOGY

## 2020-10-27 PROCEDURE — 25500020 PHARM REV CODE 255: Performed by: INTERNAL MEDICINE

## 2020-10-27 RX ADMIN — GADOBENATE DIMEGLUMINE 20 ML: 529 INJECTION, SOLUTION INTRAVENOUS at 08:10

## 2020-11-04 ENCOUNTER — PATIENT MESSAGE (OUTPATIENT)
Dept: CARDIOLOGY | Facility: CLINIC | Age: 30
End: 2020-11-04

## 2020-11-12 ENCOUNTER — OFFICE VISIT (OUTPATIENT)
Dept: CARDIOLOGY | Facility: CLINIC | Age: 30
End: 2020-11-12
Payer: COMMERCIAL

## 2020-11-12 VITALS
OXYGEN SATURATION: 98 % | DIASTOLIC BLOOD PRESSURE: 88 MMHG | WEIGHT: 140.63 LBS | BODY MASS INDEX: 20.18 KG/M2 | SYSTOLIC BLOOD PRESSURE: 122 MMHG | HEART RATE: 67 BPM

## 2020-11-12 DIAGNOSIS — R93.1 ABNORMAL ECHOCARDIOGRAM: Primary | ICD-10-CM

## 2020-11-12 PROCEDURE — 3008F BODY MASS INDEX DOCD: CPT | Mod: CPTII,S$GLB,, | Performed by: INTERNAL MEDICINE

## 2020-11-12 PROCEDURE — 99213 PR OFFICE/OUTPT VISIT, EST, LEVL III, 20-29 MIN: ICD-10-PCS | Mod: S$GLB,,, | Performed by: INTERNAL MEDICINE

## 2020-11-12 PROCEDURE — 99999 PR PBB SHADOW E&M-EST. PATIENT-LVL II: CPT | Mod: PBBFAC,,, | Performed by: INTERNAL MEDICINE

## 2020-11-12 PROCEDURE — 99213 OFFICE O/P EST LOW 20 MIN: CPT | Mod: S$GLB,,, | Performed by: INTERNAL MEDICINE

## 2020-11-12 PROCEDURE — 99999 PR PBB SHADOW E&M-EST. PATIENT-LVL II: ICD-10-PCS | Mod: PBBFAC,,, | Performed by: INTERNAL MEDICINE

## 2020-11-12 PROCEDURE — 3008F PR BODY MASS INDEX (BMI) DOCUMENTED: ICD-10-PCS | Mod: CPTII,S$GLB,, | Performed by: INTERNAL MEDICINE

## 2020-11-12 NOTE — PROGRESS NOTES
Cardiology    11/12/2020  4:16 PM    Problem list  Patient Active Problem List   Diagnosis    Closed displaced fracture of hook process of hamate    Wrist pain, right    Decreased  strength of right hand    Range of motion deficit    Abnormal echocardiogram       CC:  No complaints.     HPI:  Doing well.  Back to exercising.  Cardiac MRI was normal.    Medications  Current Outpatient Medications   Medication Sig Dispense Refill    fluticasone propionate (FLONASE ALLERGY RELIEF NASL)       albuterol (PROVENTIL/VENTOLIN HFA) 90 mcg/actuation inhaler Inhale 2 puffs into the lungs every 6 (six) hours as needed for Wheezing. 18 g 1     Current Facility-Administered Medications   Medication Dose Route Frequency Provider Last Rate Last Dose    albuterol nebulizer solution 2.5 mg  2.5 mg Nebulization 1 time in Clinic/HOD Rubens Holliday MD          Prior to Admission medications    Medication Sig Start Date End Date Taking? Authorizing Provider   fluticasone propionate (FLONASE ALLERGY RELIEF NASL)  8/19/20  Yes Historical Provider   albuterol (PROVENTIL/VENTOLIN HFA) 90 mcg/actuation inhaler Inhale 2 puffs into the lungs every 6 (six) hours as needed for Wheezing. 8/14/20   Rubens Holliday MD   cetirizine (ZYRTEC) 10 MG tablet Take 10 mg by mouth daily as needed for Allergies.  11/12/20  Historical Provider   loratadine (CLARITIN) 10 mg tablet Take 10 mg by mouth daily as needed for Allergies.  11/12/20  Historical Provider         History  Past Medical History:   Diagnosis Date    Allergies     GERD (gastroesophageal reflux disease)      Past Surgical History:   Procedure Laterality Date    none      SURGICAL REMOVAL OF CARPAL BONE Right 5/18/2020    Procedure: OSTECTOMY, CARPAL BONE, RIGHT HOOK OF HAMATE EXCISION;  Surgeon: Yesenia Quiroz MD;  Location: [facility];  Service: Orthopedics;  Laterality: Right;  REGIONAL/MAC     Social History     Socioeconomic History    Marital status:  Single     Spouse name: Not on file    Number of children: Not on file    Years of education: Not on file    Highest education level: Not on file   Occupational History    Not on file   Social Needs    Financial resource strain: Not on file    Food insecurity     Worry: Not on file     Inability: Not on file    Transportation needs     Medical: Not on file     Non-medical: Not on file   Tobacco Use    Smoking status: Never Smoker    Smokeless tobacco: Never Used   Substance and Sexual Activity    Alcohol use: Yes     Alcohol/week: 3.0 standard drinks     Types: 3 Standard drinks or equivalent per week     Comment: 3 drinks per week    Drug use: No    Sexual activity: Yes   Lifestyle    Physical activity     Days per week: Not on file     Minutes per session: Not on file    Stress: Not on file   Relationships    Social connections     Talks on phone: Not on file     Gets together: Not on file     Attends Taoist service: Not on file     Active member of club or organization: Not on file     Attends meetings of clubs or organizations: Not on file     Relationship status: Not on file   Other Topics Concern    Not on file   Social History Narrative    Not on file         Allergies  Review of patient's allergies indicates:  No Known Allergies      Review of Systems   Review of Systems   Constitution: Negative for decreased appetite, fever and weight loss.   HENT: Negative for congestion and nosebleeds.    Eyes: Negative for double vision, vision loss in left eye, vision loss in right eye and visual disturbance.   Cardiovascular: Negative for chest pain, claudication, cyanosis, dyspnea on exertion, irregular heartbeat, leg swelling, near-syncope, orthopnea, palpitations, paroxysmal nocturnal dyspnea and syncope.   Respiratory: Positive for shortness of breath. Negative for cough, hemoptysis, sleep disturbances due to breathing, snoring, sputum production and wheezing.    Endocrine: Negative for cold  intolerance and heat intolerance.   Skin: Negative for nail changes and rash.   Musculoskeletal: Negative for joint pain, muscle cramps, muscle weakness and myalgias.   Gastrointestinal: Negative for change in bowel habit, heartburn, hematemesis, hematochezia, hemorrhoids and melena.   Neurological: Negative for dizziness, focal weakness and headaches.         Physical Exam  Wt Readings from Last 1 Encounters:   11/12/20 63.8 kg (140 lb 10.5 oz)     BP Readings from Last 3 Encounters:   11/12/20 122/88   09/21/20 130/77   08/19/20 130/88     Pulse Readings from Last 1 Encounters:   11/12/20 67     Body mass index is 20.18 kg/m².    Physical Exam   Constitutional: He is oriented to person, place, and time. He appears well-developed and well-nourished.   HENT:   Head: Atraumatic.   Eyes: EOM are normal. No scleral icterus.   Neck: Neck supple. Normal carotid pulses, no hepatojugular reflux and no JVD present. Carotid bruit is not present. No edema present.   Cardiovascular: Normal rate, regular rhythm, S1 normal, S2 normal, normal heart sounds and intact distal pulses. PMI is not displaced. Exam reveals no friction rub.   No murmur heard.  Pulses:       Carotid pulses are 2+ on the right side and 2+ on the left side.       Radial pulses are 2+ on the right side and 2+ on the left side.        Dorsalis pedis pulses are 2+ on the right side and 2+ on the left side.   Pulmonary/Chest: Effort normal and breath sounds normal. No stridor. No respiratory distress. He has no wheezes. He has no rales. He exhibits no tenderness.   Abdominal: Soft. Normal aorta and bowel sounds are normal.   Musculoskeletal:         General: No edema.   Neurological: He is alert and oriented to person, place, and time.   Skin: Skin is warm and dry. No cyanosis. Nails show no clubbing.   Psychiatric: He has a normal mood and affect. His behavior is normal. Thought content normal.   Vitals reviewed.                Assessment  1. Abnormal  echocardiogram  Normal treadmill stress test.  Echo showed WMA but cardiac MRI showed normal LV and RV volumes and function and normal valves.         Plan and Discussion  Encouraged to continue diet and exercise.     Follow Up  PRN    Time spent evaluating and treating patient 15 minutes with >50% of this time being face-to-face.     Saroj Garcia MD, F.A.C.C, F.S.C.A.I.

## 2020-12-15 ENCOUNTER — OFFICE VISIT (OUTPATIENT)
Dept: INTERNAL MEDICINE | Facility: CLINIC | Age: 30
End: 2020-12-15
Attending: INTERNAL MEDICINE
Payer: COMMERCIAL

## 2020-12-15 VITALS
HEART RATE: 70 BPM | DIASTOLIC BLOOD PRESSURE: 86 MMHG | OXYGEN SATURATION: 99 % | WEIGHT: 139.31 LBS | BODY MASS INDEX: 19.94 KG/M2 | SYSTOLIC BLOOD PRESSURE: 124 MMHG | HEIGHT: 70 IN

## 2020-12-15 DIAGNOSIS — R07.89 ATYPICAL CHEST PAIN: ICD-10-CM

## 2020-12-15 DIAGNOSIS — R93.1 ABNORMAL ECHOCARDIOGRAM: ICD-10-CM

## 2020-12-15 DIAGNOSIS — M79.602 LEFT ARM PAIN: Primary | ICD-10-CM

## 2020-12-15 PROCEDURE — 3008F BODY MASS INDEX DOCD: CPT | Mod: CPTII,S$GLB,, | Performed by: INTERNAL MEDICINE

## 2020-12-15 PROCEDURE — 1126F AMNT PAIN NOTED NONE PRSNT: CPT | Mod: S$GLB,,, | Performed by: INTERNAL MEDICINE

## 2020-12-15 PROCEDURE — 99214 PR OFFICE/OUTPT VISIT, EST, LEVL IV, 30-39 MIN: ICD-10-PCS | Mod: S$GLB,,, | Performed by: INTERNAL MEDICINE

## 2020-12-15 PROCEDURE — 99999 PR PBB SHADOW E&M-EST. PATIENT-LVL III: CPT | Mod: PBBFAC,,, | Performed by: INTERNAL MEDICINE

## 2020-12-15 PROCEDURE — 99999 PR PBB SHADOW E&M-EST. PATIENT-LVL III: ICD-10-PCS | Mod: PBBFAC,,, | Performed by: INTERNAL MEDICINE

## 2020-12-15 PROCEDURE — 99214 OFFICE O/P EST MOD 30 MIN: CPT | Mod: S$GLB,,, | Performed by: INTERNAL MEDICINE

## 2020-12-15 PROCEDURE — 3008F PR BODY MASS INDEX (BMI) DOCUMENTED: ICD-10-PCS | Mod: CPTII,S$GLB,, | Performed by: INTERNAL MEDICINE

## 2020-12-15 PROCEDURE — 1126F PR PAIN SEVERITY QUANTIFIED, NO PAIN PRESENT: ICD-10-PCS | Mod: S$GLB,,, | Performed by: INTERNAL MEDICINE

## 2020-12-15 RX ORDER — MELOXICAM 7.5 MG/1
7.5 TABLET ORAL DAILY
Qty: 90 TABLET | Refills: 1 | Status: SHIPPED | OUTPATIENT
Start: 2020-12-15 | End: 2021-07-02

## 2020-12-15 NOTE — PROGRESS NOTES
"Subjective:       Patient ID: Les Whitman III is a 30 y.o. male.    Chief Complaint: Asthma, Arm Pain, and Chest Pain    Here for urgent visit      --Chest pain/Arm pain---  08/2020 Left sided CP while around smoker and subsequent dizziness. Murmur on exam leads to ECHO.  Throbbing tightness of left chest and left arm.   08/17/20 ECHO "Local segmental wall motion abnormalities: Moderate hypokinesia of mid and apical septum."  9/21/20 Exercise Stress test- normal, exercise capacity excellent.   10/27/20 Cardiac MRI-normal  12/15/20 Another episode of similar chest pain, 10 minutes after smoking marijuana, that was left axillary radiating down arm near medial epicondyle and ending palmar surface proximal to 5th finger. No color changes. No weakness. No trauma. No taking anything currently for symptoms.      Plays soccer once a week and uses albuterol prior. Otherwise no use of albuterol. No night time SOB or PB or wheezing.      Review of Systems   Constitutional: Negative for appetite change, chills, fever and unexpected weight change.   HENT: Negative for hearing loss, sore throat and trouble swallowing.    Eyes: Negative for visual disturbance.   Respiratory: Negative for cough, chest tightness and shortness of breath.    Cardiovascular: Negative for chest pain and leg swelling.   Gastrointestinal: Negative for abdominal pain, blood in stool, constipation, diarrhea, nausea and vomiting.   Endocrine: Negative for polydipsia and polyuria.   Genitourinary: Negative for decreased urine volume, difficulty urinating, dysuria, frequency and urgency.   Musculoskeletal: Negative for gait problem.   Skin: Negative for rash.   Neurological: Negative for dizziness and numbness.   Psychiatric/Behavioral: The patient is not nervous/anxious.        Objective:      Vitals:    12/15/20 0906   BP: 124/86   Pulse: 70   SpO2: 99%   Weight: 63.2 kg (139 lb 5.3 oz)   Height: 5' 10" (1.778 m)      Physical Exam  Constitutional:       " General: He is not in acute distress.     Appearance: He is well-developed.   HENT:      Head: Normocephalic and atraumatic.      Mouth/Throat:      Pharynx: No oropharyngeal exudate.   Eyes:      General: No scleral icterus.     Conjunctiva/sclera: Conjunctivae normal.      Pupils: Pupils are equal, round, and reactive to light.   Neck:      Thyroid: No thyromegaly.   Cardiovascular:      Rate and Rhythm: Normal rate and regular rhythm.      Heart sounds: Normal heart sounds. No murmur.   Pulmonary:      Effort: Pulmonary effort is normal.      Breath sounds: Normal breath sounds. No wheezing or rales.   Abdominal:      General: There is no distension.      Palpations: Abdomen is soft.      Tenderness: There is no abdominal tenderness.   Musculoskeletal:         General: No tenderness.   Lymphadenopathy:      Cervical: No cervical adenopathy.   Skin:     General: Skin is warm and dry.   Neurological:      Mental Status: He is alert and oriented to person, place, and time.   Psychiatric:         Behavior: Behavior normal.         Assessment:       1. Left arm pain    2. Atypical chest pain    3. Abnormal echocardiogram        Plan:       Les was seen today for asthma, arm pain and chest pain.    Diagnoses and all orders for this visit:    Left arm pain   Suspect this is cervical radiculopathy. HEP and anti-inflammatories. Next is PT and back and spine and plain films with flexion.  -     meloxicam (MOBIC) 7.5 MG tablet; Take 1 tablet (7.5 mg total) by mouth once daily.    Atypical chest pain   extensive cardiac workup. Highly suspicious is due to problem above. Asthma has also continued to drop on our list as cause of 2 episodes.Office and Emergency Department prompts discussed.                 Rubens Horne MD  Internal Medicine-[facility]        Side effects of medication(s) were discussed in detail and patient voiced understanding.  Patient will call back for any issues or complications.

## 2020-12-17 ENCOUNTER — PATIENT MESSAGE (OUTPATIENT)
Dept: INTERNAL MEDICINE | Facility: CLINIC | Age: 30
End: 2020-12-17

## 2021-03-31 ENCOUNTER — HOSPITAL ENCOUNTER (EMERGENCY)
Facility: OTHER | Age: 31
Discharge: HOME | End: 2021-04-01
Attending: EMERGENCY MEDICINE
Payer: COMMERCIAL

## 2021-03-31 VITALS
TEMPERATURE: 98 F | OXYGEN SATURATION: 97 % | HEART RATE: 73 BPM | RESPIRATION RATE: 18 BRPM | DIASTOLIC BLOOD PRESSURE: 80 MMHG | SYSTOLIC BLOOD PRESSURE: 135 MMHG

## 2021-03-31 DIAGNOSIS — R55 NEAR SYNCOPE: Primary | ICD-10-CM

## 2021-03-31 DIAGNOSIS — R07.9 CHEST PAIN: ICD-10-CM

## 2021-03-31 LAB
ALBUMIN SERPL BCP-MCNC: 5 G/DL (ref 3.5–5.2)
ALP SERPL-CCNC: 99 U/L (ref 55–135)
ALT SERPL W/O P-5'-P-CCNC: 21 U/L (ref 10–44)
ANION GAP SERPL CALC-SCNC: 11 MMOL/L (ref 8–16)
AST SERPL-CCNC: 22 U/L (ref 10–40)
BASOPHILS # BLD AUTO: 0.03 K/UL (ref 0–0.2)
BASOPHILS NFR BLD: 0.5 % (ref 0–1.9)
BILIRUB SERPL-MCNC: 0.6 MG/DL (ref 0.1–1)
BUN SERPL-MCNC: 10 MG/DL (ref 6–20)
CALCIUM SERPL-MCNC: 9.7 MG/DL (ref 8.7–10.5)
CHLORIDE SERPL-SCNC: 104 MMOL/L (ref 95–110)
CO2 SERPL-SCNC: 25 MMOL/L (ref 23–29)
CREAT SERPL-MCNC: 1 MG/DL (ref 0.5–1.4)
DIFFERENTIAL METHOD: ABNORMAL
EOSINOPHIL # BLD AUTO: 0 K/UL (ref 0–0.5)
EOSINOPHIL NFR BLD: 0.7 % (ref 0–8)
ERYTHROCYTE [DISTWIDTH] IN BLOOD BY AUTOMATED COUNT: 11.4 % (ref 11.5–14.5)
EST. GFR  (AFRICAN AMERICAN): >60 ML/MIN/1.73 M^2
EST. GFR  (NON AFRICAN AMERICAN): >60 ML/MIN/1.73 M^2
GLUCOSE SERPL-MCNC: 110 MG/DL (ref 70–110)
HCT VFR BLD AUTO: 42.2 % (ref 40–54)
HGB BLD-MCNC: 15 G/DL (ref 14–18)
IMM GRANULOCYTES # BLD AUTO: 0.01 K/UL (ref 0–0.04)
IMM GRANULOCYTES NFR BLD AUTO: 0.2 % (ref 0–0.5)
LYMPHOCYTES # BLD AUTO: 1.3 K/UL (ref 1–4.8)
LYMPHOCYTES NFR BLD: 20.5 % (ref 18–48)
MAGNESIUM SERPL-MCNC: 1.7 MG/DL (ref 1.6–2.6)
MCH RBC QN AUTO: 31.6 PG (ref 27–31)
MCHC RBC AUTO-ENTMCNC: 35.5 G/DL (ref 32–36)
MCV RBC AUTO: 89 FL (ref 82–98)
MONOCYTES # BLD AUTO: 0.6 K/UL (ref 0.3–1)
MONOCYTES NFR BLD: 9.7 % (ref 4–15)
NEUTROPHILS # BLD AUTO: 4.2 K/UL (ref 1.8–7.7)
NEUTROPHILS NFR BLD: 68.4 % (ref 38–73)
NRBC BLD-RTO: 0 /100 WBC
PHOSPHATE SERPL-MCNC: 3.4 MG/DL (ref 2.7–4.5)
PLATELET # BLD AUTO: 222 K/UL (ref 150–450)
PMV BLD AUTO: 9.4 FL (ref 9.2–12.9)
POTASSIUM SERPL-SCNC: 3.7 MMOL/L (ref 3.5–5.1)
PROT SERPL-MCNC: 8.1 G/DL (ref 6–8.4)
RBC # BLD AUTO: 4.75 M/UL (ref 4.6–6.2)
SODIUM SERPL-SCNC: 140 MMOL/L (ref 136–145)
TROPONIN I SERPL DL<=0.01 NG/ML-MCNC: <0.006 NG/ML (ref 0–0.03)
TSH SERPL DL<=0.005 MIU/L-ACNC: 3.41 UIU/ML (ref 0.4–4)
WBC # BLD AUTO: 6.1 K/UL (ref 3.9–12.7)

## 2021-03-31 PROCEDURE — 93005 ELECTROCARDIOGRAM TRACING: CPT

## 2021-03-31 PROCEDURE — 84100 ASSAY OF PHOSPHORUS: CPT | Performed by: EMERGENCY MEDICINE

## 2021-03-31 PROCEDURE — 83735 ASSAY OF MAGNESIUM: CPT | Performed by: EMERGENCY MEDICINE

## 2021-03-31 PROCEDURE — 93010 ELECTROCARDIOGRAM REPORT: CPT | Mod: ,,, | Performed by: INTERNAL MEDICINE

## 2021-03-31 PROCEDURE — 84484 ASSAY OF TROPONIN QUANT: CPT | Performed by: EMERGENCY MEDICINE

## 2021-03-31 PROCEDURE — 93010 EKG 12-LEAD: ICD-10-PCS | Mod: ,,, | Performed by: INTERNAL MEDICINE

## 2021-03-31 PROCEDURE — 85025 COMPLETE CBC W/AUTO DIFF WBC: CPT | Performed by: EMERGENCY MEDICINE

## 2021-03-31 PROCEDURE — 25000003 PHARM REV CODE 250: Performed by: EMERGENCY MEDICINE

## 2021-03-31 PROCEDURE — 99285 EMERGENCY DEPT VISIT HI MDM: CPT | Mod: 25

## 2021-03-31 PROCEDURE — 84443 ASSAY THYROID STIM HORMONE: CPT | Performed by: EMERGENCY MEDICINE

## 2021-03-31 PROCEDURE — 80053 COMPREHEN METABOLIC PANEL: CPT | Performed by: EMERGENCY MEDICINE

## 2021-03-31 RX ORDER — LORAZEPAM 1 MG/1
1 TABLET ORAL
Status: COMPLETED | OUTPATIENT
Start: 2021-03-31 | End: 2021-03-31

## 2021-03-31 RX ADMIN — LORAZEPAM 1 MG: 1 TABLET ORAL at 07:03

## 2021-03-31 RX ADMIN — SODIUM CHLORIDE 1000 ML: 0.9 INJECTION, SOLUTION INTRAVENOUS at 07:03

## 2021-04-01 ENCOUNTER — HOSPITAL ENCOUNTER (OUTPATIENT)
Dept: RADIOLOGY | Facility: OTHER | Age: 31
Discharge: HOME | End: 2021-04-01
Attending: INTERNAL MEDICINE
Payer: COMMERCIAL

## 2021-04-01 ENCOUNTER — OFFICE VISIT (OUTPATIENT)
Dept: INTERNAL MEDICINE | Facility: CLINIC | Age: 31
End: 2021-04-01
Attending: INTERNAL MEDICINE
Payer: COMMERCIAL

## 2021-04-01 VITALS
BODY MASS INDEX: 20.33 KG/M2 | HEART RATE: 74 BPM | WEIGHT: 142 LBS | HEIGHT: 70 IN | OXYGEN SATURATION: 99 % | SYSTOLIC BLOOD PRESSURE: 124 MMHG | DIASTOLIC BLOOD PRESSURE: 82 MMHG

## 2021-04-01 DIAGNOSIS — R42 EPISODIC LIGHTHEADEDNESS: Primary | ICD-10-CM

## 2021-04-01 DIAGNOSIS — G44.209 ACUTE NON INTRACTABLE TENSION-TYPE HEADACHE: ICD-10-CM

## 2021-04-01 DIAGNOSIS — F41.9 ANXIETY: ICD-10-CM

## 2021-04-01 PROCEDURE — 70450 CT HEAD/BRAIN W/O DYE: CPT | Mod: TC

## 2021-04-01 PROCEDURE — 99999 PR PBB SHADOW E&M-EST. PATIENT-LVL III: ICD-10-PCS | Mod: PBBFAC,,, | Performed by: INTERNAL MEDICINE

## 2021-04-01 PROCEDURE — 99214 PR OFFICE/OUTPT VISIT, EST, LEVL IV, 30-39 MIN: ICD-10-PCS | Mod: S$GLB,,, | Performed by: INTERNAL MEDICINE

## 2021-04-01 PROCEDURE — 99214 OFFICE O/P EST MOD 30 MIN: CPT | Mod: S$GLB,,, | Performed by: INTERNAL MEDICINE

## 2021-04-01 PROCEDURE — 3008F BODY MASS INDEX DOCD: CPT | Mod: CPTII,S$GLB,, | Performed by: INTERNAL MEDICINE

## 2021-04-01 PROCEDURE — 3008F PR BODY MASS INDEX (BMI) DOCUMENTED: ICD-10-PCS | Mod: CPTII,S$GLB,, | Performed by: INTERNAL MEDICINE

## 2021-04-01 PROCEDURE — 70450 CT HEAD WITHOUT CONTRAST: ICD-10-PCS | Mod: 26,,, | Performed by: RADIOLOGY

## 2021-04-01 PROCEDURE — 1126F PR PAIN SEVERITY QUANTIFIED, NO PAIN PRESENT: ICD-10-PCS | Mod: S$GLB,,, | Performed by: INTERNAL MEDICINE

## 2021-04-01 PROCEDURE — 99999 PR PBB SHADOW E&M-EST. PATIENT-LVL III: CPT | Mod: PBBFAC,,, | Performed by: INTERNAL MEDICINE

## 2021-04-01 PROCEDURE — 70450 CT HEAD/BRAIN W/O DYE: CPT | Mod: 26,,, | Performed by: RADIOLOGY

## 2021-04-01 PROCEDURE — 1126F AMNT PAIN NOTED NONE PRSNT: CPT | Mod: S$GLB,,, | Performed by: INTERNAL MEDICINE

## 2021-04-01 RX ORDER — SERTRALINE HYDROCHLORIDE 50 MG/1
50 TABLET, FILM COATED ORAL DAILY
Qty: 90 TABLET | Refills: 2 | Status: SHIPPED | OUTPATIENT
Start: 2021-04-01 | End: 2021-12-15 | Stop reason: SDUPTHER

## 2021-04-01 RX ORDER — SERTRALINE HYDROCHLORIDE 50 MG/1
50 TABLET, FILM COATED ORAL DAILY
Qty: 90 TABLET | Refills: 2 | Status: SHIPPED | OUTPATIENT
Start: 2021-04-01 | End: 2021-04-01

## 2021-04-16 ENCOUNTER — PATIENT MESSAGE (OUTPATIENT)
Dept: RESEARCH | Facility: HOSPITAL | Age: 31
End: 2021-04-16

## 2021-05-03 ENCOUNTER — PATIENT MESSAGE (OUTPATIENT)
Dept: INTERNAL MEDICINE | Facility: CLINIC | Age: 31
End: 2021-05-03

## 2021-05-07 ENCOUNTER — OFFICE VISIT (OUTPATIENT)
Dept: CARDIOLOGY | Facility: CLINIC | Age: 31
End: 2021-05-07
Payer: COMMERCIAL

## 2021-05-07 VITALS
WEIGHT: 137 LBS | HEART RATE: 77 BPM | HEIGHT: 70 IN | SYSTOLIC BLOOD PRESSURE: 132 MMHG | BODY MASS INDEX: 19.61 KG/M2 | OXYGEN SATURATION: 99 % | DIASTOLIC BLOOD PRESSURE: 90 MMHG

## 2021-05-07 DIAGNOSIS — R00.2 PALPITATIONS: Primary | ICD-10-CM

## 2021-05-07 PROCEDURE — 3008F PR BODY MASS INDEX (BMI) DOCUMENTED: ICD-10-PCS | Mod: CPTII,S$GLB,, | Performed by: INTERNAL MEDICINE

## 2021-05-07 PROCEDURE — 99999 PR PBB SHADOW E&M-EST. PATIENT-LVL III: CPT | Mod: PBBFAC,,, | Performed by: INTERNAL MEDICINE

## 2021-05-07 PROCEDURE — 3008F BODY MASS INDEX DOCD: CPT | Mod: CPTII,S$GLB,, | Performed by: INTERNAL MEDICINE

## 2021-05-07 PROCEDURE — 99213 PR OFFICE/OUTPT VISIT, EST, LEVL III, 20-29 MIN: ICD-10-PCS | Mod: S$GLB,,, | Performed by: INTERNAL MEDICINE

## 2021-05-07 PROCEDURE — 1126F AMNT PAIN NOTED NONE PRSNT: CPT | Mod: S$GLB,,, | Performed by: INTERNAL MEDICINE

## 2021-05-07 PROCEDURE — 1126F PR PAIN SEVERITY QUANTIFIED, NO PAIN PRESENT: ICD-10-PCS | Mod: S$GLB,,, | Performed by: INTERNAL MEDICINE

## 2021-05-07 PROCEDURE — 99999 PR PBB SHADOW E&M-EST. PATIENT-LVL III: ICD-10-PCS | Mod: PBBFAC,,, | Performed by: INTERNAL MEDICINE

## 2021-05-07 PROCEDURE — 99213 OFFICE O/P EST LOW 20 MIN: CPT | Mod: S$GLB,,, | Performed by: INTERNAL MEDICINE

## 2021-05-11 ENCOUNTER — CLINICAL SUPPORT (OUTPATIENT)
Dept: CARDIOLOGY | Facility: HOSPITAL | Age: 31
End: 2021-05-11
Attending: INTERNAL MEDICINE
Payer: COMMERCIAL

## 2021-05-11 DIAGNOSIS — R00.2 PALPITATIONS: ICD-10-CM

## 2021-05-11 PROCEDURE — 93271 ECG/MONITORING AND ANALYSIS: CPT

## 2021-05-11 PROCEDURE — 93272 CARDIAC EVENT MONITOR (CUPID ONLY): ICD-10-PCS | Mod: ,,, | Performed by: STUDENT IN AN ORGANIZED HEALTH CARE EDUCATION/TRAINING PROGRAM

## 2021-05-11 PROCEDURE — 93272 ECG/REVIEW INTERPRET ONLY: CPT | Mod: ,,, | Performed by: STUDENT IN AN ORGANIZED HEALTH CARE EDUCATION/TRAINING PROGRAM

## 2021-07-02 ENCOUNTER — OFFICE VISIT (OUTPATIENT)
Dept: CARDIOLOGY | Facility: CLINIC | Age: 31
End: 2021-07-02
Payer: COMMERCIAL

## 2021-07-02 VITALS
SYSTOLIC BLOOD PRESSURE: 126 MMHG | WEIGHT: 147.94 LBS | HEIGHT: 70 IN | BODY MASS INDEX: 21.18 KG/M2 | DIASTOLIC BLOOD PRESSURE: 80 MMHG | HEART RATE: 98 BPM | OXYGEN SATURATION: 98 %

## 2021-07-02 DIAGNOSIS — R93.1 ABNORMAL ECHOCARDIOGRAM: ICD-10-CM

## 2021-07-02 DIAGNOSIS — R00.2 PALPITATIONS: Primary | ICD-10-CM

## 2021-07-02 PROCEDURE — 99999 PR PBB SHADOW E&M-EST. PATIENT-LVL III: CPT | Mod: PBBFAC,,, | Performed by: INTERNAL MEDICINE

## 2021-07-02 PROCEDURE — 3008F BODY MASS INDEX DOCD: CPT | Mod: CPTII,S$GLB,, | Performed by: INTERNAL MEDICINE

## 2021-07-02 PROCEDURE — 99213 PR OFFICE/OUTPT VISIT, EST, LEVL III, 20-29 MIN: ICD-10-PCS | Mod: S$GLB,,, | Performed by: INTERNAL MEDICINE

## 2021-07-02 PROCEDURE — 1126F PR PAIN SEVERITY QUANTIFIED, NO PAIN PRESENT: ICD-10-PCS | Mod: S$GLB,,, | Performed by: INTERNAL MEDICINE

## 2021-07-02 PROCEDURE — 3008F PR BODY MASS INDEX (BMI) DOCUMENTED: ICD-10-PCS | Mod: CPTII,S$GLB,, | Performed by: INTERNAL MEDICINE

## 2021-07-02 PROCEDURE — 99999 PR PBB SHADOW E&M-EST. PATIENT-LVL III: ICD-10-PCS | Mod: PBBFAC,,, | Performed by: INTERNAL MEDICINE

## 2021-07-02 PROCEDURE — 1126F AMNT PAIN NOTED NONE PRSNT: CPT | Mod: S$GLB,,, | Performed by: INTERNAL MEDICINE

## 2021-07-02 PROCEDURE — 99213 OFFICE O/P EST LOW 20 MIN: CPT | Mod: S$GLB,,, | Performed by: INTERNAL MEDICINE

## 2021-12-15 ENCOUNTER — OFFICE VISIT (OUTPATIENT)
Dept: INTERNAL MEDICINE | Facility: CLINIC | Age: 31
End: 2021-12-15
Attending: INTERNAL MEDICINE
Payer: COMMERCIAL

## 2021-12-15 VITALS
OXYGEN SATURATION: 100 % | HEART RATE: 71 BPM | SYSTOLIC BLOOD PRESSURE: 128 MMHG | WEIGHT: 142.63 LBS | BODY MASS INDEX: 20.42 KG/M2 | DIASTOLIC BLOOD PRESSURE: 70 MMHG | HEIGHT: 70 IN

## 2021-12-15 DIAGNOSIS — F41.9 ANXIETY: ICD-10-CM

## 2021-12-15 DIAGNOSIS — R23.9 SKIN CHANGE: Primary | ICD-10-CM

## 2021-12-15 PROCEDURE — 99213 OFFICE O/P EST LOW 20 MIN: CPT | Mod: S$GLB,,, | Performed by: INTERNAL MEDICINE

## 2021-12-15 PROCEDURE — 99999 PR PBB SHADOW E&M-EST. PATIENT-LVL III: ICD-10-PCS | Mod: PBBFAC,,, | Performed by: INTERNAL MEDICINE

## 2021-12-15 PROCEDURE — 99213 PR OFFICE/OUTPT VISIT, EST, LEVL III, 20-29 MIN: ICD-10-PCS | Mod: S$GLB,,, | Performed by: INTERNAL MEDICINE

## 2021-12-15 PROCEDURE — 99999 PR PBB SHADOW E&M-EST. PATIENT-LVL III: CPT | Mod: PBBFAC,,, | Performed by: INTERNAL MEDICINE

## 2021-12-15 RX ORDER — SERTRALINE HYDROCHLORIDE 50 MG/1
50 TABLET, FILM COATED ORAL DAILY
Qty: 90 TABLET | Refills: 2 | Status: SHIPPED | OUTPATIENT
Start: 2021-12-15 | End: 2022-10-03 | Stop reason: SDUPTHER

## 2022-01-27 ENCOUNTER — TELEPHONE (OUTPATIENT)
Dept: DERMATOLOGY | Facility: CLINIC | Age: 32
End: 2022-01-27
Payer: COMMERCIAL

## 2022-02-14 ENCOUNTER — OFFICE VISIT (OUTPATIENT)
Dept: DERMATOLOGY | Facility: CLINIC | Age: 32
End: 2022-02-14
Payer: COMMERCIAL

## 2022-02-14 DIAGNOSIS — D22.9 MULTIPLE BENIGN NEVI: ICD-10-CM

## 2022-02-14 DIAGNOSIS — B07.8 OTHER VIRAL WARTS: Primary | ICD-10-CM

## 2022-02-14 DIAGNOSIS — R23.9 SKIN CHANGE: ICD-10-CM

## 2022-02-14 DIAGNOSIS — D23.9 DERMATOFIBROMA: ICD-10-CM

## 2022-02-14 DIAGNOSIS — L21.9 ACUTE SEBORRHEIC DERMATITIS: ICD-10-CM

## 2022-02-14 PROCEDURE — 1160F PR REVIEW ALL MEDS BY PRESCRIBER/CLIN PHARMACIST DOCUMENTED: ICD-10-PCS | Mod: CPTII,S$GLB,, | Performed by: DERMATOLOGY

## 2022-02-14 PROCEDURE — 99203 OFFICE O/P NEW LOW 30 MIN: CPT | Mod: 25,S$GLB,, | Performed by: DERMATOLOGY

## 2022-02-14 PROCEDURE — 99999 PR PBB SHADOW E&M-EST. PATIENT-LVL III: CPT | Mod: PBBFAC,,, | Performed by: DERMATOLOGY

## 2022-02-14 PROCEDURE — 99999 PR PBB SHADOW E&M-EST. PATIENT-LVL III: ICD-10-PCS | Mod: PBBFAC,,, | Performed by: DERMATOLOGY

## 2022-02-14 PROCEDURE — 17111 PR DESTRUCTION BENIGN LESIONS 15 OR MORE: ICD-10-PCS | Mod: S$GLB,,, | Performed by: DERMATOLOGY

## 2022-02-14 PROCEDURE — 1159F PR MEDICATION LIST DOCUMENTED IN MEDICAL RECORD: ICD-10-PCS | Mod: CPTII,S$GLB,, | Performed by: DERMATOLOGY

## 2022-02-14 PROCEDURE — 1160F RVW MEDS BY RX/DR IN RCRD: CPT | Mod: CPTII,S$GLB,, | Performed by: DERMATOLOGY

## 2022-02-14 PROCEDURE — 1159F MED LIST DOCD IN RCRD: CPT | Mod: CPTII,S$GLB,, | Performed by: DERMATOLOGY

## 2022-02-14 PROCEDURE — 17111 DESTRUCTION B9 LESIONS 15/>: CPT | Mod: S$GLB,,, | Performed by: DERMATOLOGY

## 2022-02-14 PROCEDURE — 99203 PR OFFICE/OUTPT VISIT, NEW, LEVL III, 30-44 MIN: ICD-10-PCS | Mod: 25,S$GLB,, | Performed by: DERMATOLOGY

## 2022-02-14 NOTE — PROGRESS NOTES
Subjective:       Patient ID:  Les Whitman III is a 31 y.o. male who presents for   Chief Complaint   Patient presents with    Dermatitis     Face , scalp     HPI  Pt here today for bumps on L hand x 6 mo - a year.  Asymptomatic and no prior treatment.  Starting to crack with the cold, dry weather.    Also has a hx of seb derm, but states it's well-controlled with OTC products.    Interested in upper body skin check today.  No personal history of skin cancer or atypical moles.    Review of Systems   Constitutional: Negative.  Negative for fever and chills.   Skin: Positive for daily sunscreen use, activity-related sunscreen use and wears hat.   Hematologic/Lymphatic: Does not bruise/bleed easily.        Objective:    Physical Exam   Constitutional: He appears well-developed and well-nourished. No distress.   Neurological: He is alert and oriented to person, place, and time. He is not disoriented.   Psychiatric: He has a normal mood and affect.   Skin:   Areas Examined (abnormalities noted in diagram):   Head / Face Inspection Performed  Neck Inspection Performed  Chest / Axilla Inspection Performed  Abdomen Inspection Performed  Back Inspection Performed  RUE Inspected  LUE Inspection Performed  Nails and Digits Inspection Performed                       Diagram Legend     Erythematous scaling macule/papule c/w actinic keratosis       Vascular papule c/w angioma      Pigmented verrucoid papule/plaque c/w seborrheic keratosis      Yellow umbilicated papule c/w sebaceous hyperplasia      Irregularly shaped tan macule c/w lentigo     1-2 mm smooth white papules consistent with Milia      Movable subcutaneous cyst with punctum c/w epidermal inclusion cyst      Subcutaneous movable cyst c/w pilar cyst      Firm pink to brown papule c/w dermatofibroma      Pedunculated fleshy papule(s) c/w skin tag(s)      Evenly pigmented macule c/w junctional nevus     Mildly variegated pigmented, slightly irregular-bordered  macule c/w mildly atypical nevus      Flesh colored to evenly pigmented papule c/w intradermal nevus       Pink pearly papule/plaque c/w basal cell carcinoma      Erythematous hyperkeratotic cursted plaque c/w SCC      Surgical scar with no sign of skin cancer recurrence      Open and closed comedones      Inflammatory papules and pustules      Verrucoid papule consistent consistent with wart     Erythematous eczematous patches and plaques     Dystrophic onycholytic nail with subungual debris c/w onychomycosis     Umbilicated papule    Erythematous-base heme-crusted tan verrucoid plaque consistent with inflamed seborrheic keratosis     Erythematous Silvery Scaling Plaque c/w Psoriasis     See annotation      Assessment / Plan:        Other viral warts  Cryosurgery procedure note:    Verbal consent from the patient is obtained including, but not limited to, risk of hypopigmentation/hyperpigmentation, scar, recurrence of lesion. Liquid nitrogen cryosurgery is applied to ~20 small flat warts (majority in a ~1cm cluster on MCP joint) to produce a freeze injury. The patient is aware that blisters may form and is instructed on wound care with gentle cleansing and use of vaseline ointment to keep moist until healed. The patient is supplied a handout on cryosurgery and is instructed to call if lesions do not completely resolve.    Multiple benign nevi  Benign-appearing nevi present on exam today. Reassurance provided. Periodically examine moles and return to clinic if any moles change or become symptomatic (bleeding, itching, pain, etc).    Dermatofibroma  Reassurance given to patient. No treatment is necessary.  This is benign scar tissue from previous minor trauma to the skin such as a bug bite.    Acute seborrheic dermatitis  Pt would like to continue OTC tx.  Will notify me if these stop working for him.         Follow up in about 4 weeks (around 3/14/2022). for mike

## 2022-02-14 NOTE — PATIENT INSTRUCTIONS
CRYOSURGERY      Your doctor has used a method called cryosurgery to treat your skin condition. Cryosurgery refers to the use of very cold substances to treat a variety of skin conditions such as warts, pre-skin cancers, molluscum contagiosum, sun spots, and several benign growths. The substance we use in cryosurgery is liquid nitrogen and is so cold (-195 degrees Celsius) that is burns when administered.     Following treatment in the office, the skin may immediately burn and become red. You may find the area around the lesion is affected as well. It is sometimes necessary to treat not only the lesion, but a small area of the surrounding normal skin to achieve a good response.     A blister, and even a blood filled blister, may form after treatment.   This is a normal response. If the blister is painful, it is acceptable to sterilize a needle and with rubbing alcohol and gently pop the blister. It is important that you gently wash the area with soap and warm water as the blister fluid may contain wart virus if a wart was treated. Do no remove the roof of the blister.     The area treated can take anywhere from 1-3 weeks to heal. Healing time depends on the kind skin lesion treated, the location, and how aggressively the lesion was treated. It is recommended that the areas treated are covered with Vaseline or bacitracin ointment and a band-aid. If a band-aid is not practical, just ointment applied several times per day will do. Keeping these areas moist will speed the healing time.    Treatment with liquid nitrogen can leave a scar. In dark skin, it may be a light or dark scar, in light skin it may be a white or pink scar. These will generally fade with time, but may never go away completely.     If you have any concerns after your treatment, please feel free to call the office.       [address] [facility] [place] [address]/ [phone] FAX/ [email or web address]    Sunscreen  Recommendations:    Recommend daily sun protection/avoidance and use of at least SPF 30, broad spectrum sunscreen.     Based on a recent study (2021) and out of an abundance of caution, we are recommending that you AVOID the followin. Spray and gel sunscreens  2. Any [facility] or [facility] brands as well as Max Block and TopCare brands   3. Neutrogena Ultra Sheer Dry-touch Water Resistant Suncscreen LOTION SPF 70   4. Neutrogena Sheer Zinc Dry-touch Face Sunscreen LOTION SPF 50   5.   Aveeno Baby Continuous Protection Sensitive Skin Sunscreen LOTION - Broad Spectrum SPF 50    Please note that Benzene is not an ingredient or the degradation product of any ingredient in any sunscreen. This study suggested that the findings are a result of contamination in the manufacturing process. At this point, we don't know how effectively Benzene gets through the skin, if it gets absorbed systemically, and what effects that may have.     We do know that ultraviolet radiation is a well-established carcinogen. Please use daily sun protection/avoidance and use of at least SPF 30, broad-spectrum sunscreen not listed above.

## 2022-04-12 ENCOUNTER — LAB VISIT (OUTPATIENT)
Dept: LAB | Facility: OTHER | Age: 32
End: 2022-04-12
Attending: INTERNAL MEDICINE
Payer: COMMERCIAL

## 2022-04-12 ENCOUNTER — OFFICE VISIT (OUTPATIENT)
Dept: INTERNAL MEDICINE | Facility: CLINIC | Age: 32
End: 2022-04-12
Attending: INTERNAL MEDICINE
Payer: COMMERCIAL

## 2022-04-12 VITALS
DIASTOLIC BLOOD PRESSURE: 74 MMHG | WEIGHT: 138.69 LBS | BODY MASS INDEX: 19.86 KG/M2 | OXYGEN SATURATION: 99 % | HEART RATE: 74 BPM | HEIGHT: 70 IN | SYSTOLIC BLOOD PRESSURE: 112 MMHG

## 2022-04-12 DIAGNOSIS — Z11.4 SCREENING FOR HIV WITHOUT PRESENCE OF RISK FACTORS: ICD-10-CM

## 2022-04-12 DIAGNOSIS — Z11.59 ENCOUNTER FOR HEPATITIS C SCREENING TEST FOR LOW RISK PATIENT: ICD-10-CM

## 2022-04-12 DIAGNOSIS — Z00.00 ANNUAL PHYSICAL EXAM: Primary | ICD-10-CM

## 2022-04-12 DIAGNOSIS — Z00.00 ANNUAL PHYSICAL EXAM: ICD-10-CM

## 2022-04-12 DIAGNOSIS — R00.2 PALPITATIONS: ICD-10-CM

## 2022-04-12 LAB
ALBUMIN SERPL BCP-MCNC: 5.1 G/DL (ref 3.5–5.2)
ALP SERPL-CCNC: 82 U/L (ref 55–135)
ALT SERPL W/O P-5'-P-CCNC: 20 U/L (ref 10–44)
ANION GAP SERPL CALC-SCNC: 12 MMOL/L (ref 8–16)
AST SERPL-CCNC: 23 U/L (ref 10–40)
BASOPHILS # BLD AUTO: 0.03 K/UL (ref 0–0.2)
BASOPHILS NFR BLD: 0.3 % (ref 0–1.9)
BILIRUB SERPL-MCNC: 0.6 MG/DL (ref 0.1–1)
BUN SERPL-MCNC: 10 MG/DL (ref 6–20)
CALCIUM SERPL-MCNC: 10.1 MG/DL (ref 8.7–10.5)
CHLORIDE SERPL-SCNC: 104 MMOL/L (ref 95–110)
CHOLEST SERPL-MCNC: 189 MG/DL (ref 120–199)
CHOLEST/HDLC SERPL: 2.7 {RATIO} (ref 2–5)
CO2 SERPL-SCNC: 26 MMOL/L (ref 23–29)
CREAT SERPL-MCNC: 1 MG/DL (ref 0.5–1.4)
DIFFERENTIAL METHOD: ABNORMAL
EOSINOPHIL # BLD AUTO: 0.1 K/UL (ref 0–0.5)
EOSINOPHIL NFR BLD: 1 % (ref 0–8)
ERYTHROCYTE [DISTWIDTH] IN BLOOD BY AUTOMATED COUNT: 11.9 % (ref 11.5–14.5)
EST. GFR  (AFRICAN AMERICAN): >60 ML/MIN/1.73 M^2
EST. GFR  (NON AFRICAN AMERICAN): >60 ML/MIN/1.73 M^2
ESTIMATED AVG GLUCOSE: 103 MG/DL (ref 68–131)
GLUCOSE SERPL-MCNC: 89 MG/DL (ref 70–110)
HBA1C MFR BLD: 5.2 % (ref 4–5.6)
HCT VFR BLD AUTO: 42.3 % (ref 40–54)
HDLC SERPL-MCNC: 71 MG/DL (ref 40–75)
HDLC SERPL: 37.6 % (ref 20–50)
HGB BLD-MCNC: 14.8 G/DL (ref 14–18)
IMM GRANULOCYTES # BLD AUTO: 0.02 K/UL (ref 0–0.04)
IMM GRANULOCYTES NFR BLD AUTO: 0.2 % (ref 0–0.5)
LDLC SERPL CALC-MCNC: 97.6 MG/DL (ref 63–159)
LYMPHOCYTES # BLD AUTO: 2.1 K/UL (ref 1–4.8)
LYMPHOCYTES NFR BLD: 23.6 % (ref 18–48)
MCH RBC QN AUTO: 32.3 PG (ref 27–31)
MCHC RBC AUTO-ENTMCNC: 35 G/DL (ref 32–36)
MCV RBC AUTO: 92 FL (ref 82–98)
MONOCYTES # BLD AUTO: 0.6 K/UL (ref 0.3–1)
MONOCYTES NFR BLD: 6.8 % (ref 4–15)
NEUTROPHILS # BLD AUTO: 6 K/UL (ref 1.8–7.7)
NEUTROPHILS NFR BLD: 68.1 % (ref 38–73)
NONHDLC SERPL-MCNC: 118 MG/DL
NRBC BLD-RTO: 0 /100 WBC
PLATELET # BLD AUTO: 276 K/UL (ref 150–450)
PMV BLD AUTO: 9.4 FL (ref 9.2–12.9)
POTASSIUM SERPL-SCNC: 4.2 MMOL/L (ref 3.5–5.1)
PROT SERPL-MCNC: 8.3 G/DL (ref 6–8.4)
RBC # BLD AUTO: 4.58 M/UL (ref 4.6–6.2)
SODIUM SERPL-SCNC: 142 MMOL/L (ref 136–145)
TRIGL SERPL-MCNC: 102 MG/DL (ref 30–150)
TSH SERPL DL<=0.005 MIU/L-ACNC: 3.15 UIU/ML (ref 0.4–4)
WBC # BLD AUTO: 8.77 K/UL (ref 3.9–12.7)

## 2022-04-12 PROCEDURE — 1160F PR REVIEW ALL MEDS BY PRESCRIBER/CLIN PHARMACIST DOCUMENTED: ICD-10-PCS | Mod: CPTII,S$GLB,, | Performed by: INTERNAL MEDICINE

## 2022-04-12 PROCEDURE — 3078F PR MOST RECENT DIASTOLIC BLOOD PRESSURE < 80 MM HG: ICD-10-PCS | Mod: CPTII,S$GLB,, | Performed by: INTERNAL MEDICINE

## 2022-04-12 PROCEDURE — 3008F BODY MASS INDEX DOCD: CPT | Mod: CPTII,S$GLB,, | Performed by: INTERNAL MEDICINE

## 2022-04-12 PROCEDURE — 80061 LIPID PANEL: CPT | Performed by: INTERNAL MEDICINE

## 2022-04-12 PROCEDURE — 84443 ASSAY THYROID STIM HORMONE: CPT | Performed by: INTERNAL MEDICINE

## 2022-04-12 PROCEDURE — 86803 HEPATITIS C AB TEST: CPT | Performed by: INTERNAL MEDICINE

## 2022-04-12 PROCEDURE — 85025 COMPLETE CBC W/AUTO DIFF WBC: CPT | Performed by: INTERNAL MEDICINE

## 2022-04-12 PROCEDURE — 99999 PR PBB SHADOW E&M-EST. PATIENT-LVL III: CPT | Mod: PBBFAC,,, | Performed by: INTERNAL MEDICINE

## 2022-04-12 PROCEDURE — 83036 HEMOGLOBIN GLYCOSYLATED A1C: CPT | Performed by: INTERNAL MEDICINE

## 2022-04-12 PROCEDURE — 3008F PR BODY MASS INDEX (BMI) DOCUMENTED: ICD-10-PCS | Mod: CPTII,S$GLB,, | Performed by: INTERNAL MEDICINE

## 2022-04-12 PROCEDURE — 3074F SYST BP LT 130 MM HG: CPT | Mod: CPTII,S$GLB,, | Performed by: INTERNAL MEDICINE

## 2022-04-12 PROCEDURE — 36415 COLL VENOUS BLD VENIPUNCTURE: CPT | Performed by: INTERNAL MEDICINE

## 2022-04-12 PROCEDURE — 80053 COMPREHEN METABOLIC PANEL: CPT | Performed by: INTERNAL MEDICINE

## 2022-04-12 PROCEDURE — 99999 PR PBB SHADOW E&M-EST. PATIENT-LVL III: ICD-10-PCS | Mod: PBBFAC,,, | Performed by: INTERNAL MEDICINE

## 2022-04-12 PROCEDURE — 99395 PREV VISIT EST AGE 18-39: CPT | Mod: S$GLB,,, | Performed by: INTERNAL MEDICINE

## 2022-04-12 PROCEDURE — 1159F PR MEDICATION LIST DOCUMENTED IN MEDICAL RECORD: ICD-10-PCS | Mod: CPTII,S$GLB,, | Performed by: INTERNAL MEDICINE

## 2022-04-12 PROCEDURE — 1160F RVW MEDS BY RX/DR IN RCRD: CPT | Mod: CPTII,S$GLB,, | Performed by: INTERNAL MEDICINE

## 2022-04-12 PROCEDURE — 87389 HIV-1 AG W/HIV-1&-2 AB AG IA: CPT | Performed by: INTERNAL MEDICINE

## 2022-04-12 PROCEDURE — 3078F DIAST BP <80 MM HG: CPT | Mod: CPTII,S$GLB,, | Performed by: INTERNAL MEDICINE

## 2022-04-12 PROCEDURE — 3074F PR MOST RECENT SYSTOLIC BLOOD PRESSURE < 130 MM HG: ICD-10-PCS | Mod: CPTII,S$GLB,, | Performed by: INTERNAL MEDICINE

## 2022-04-12 PROCEDURE — 99395 PR PREVENTIVE VISIT,EST,18-39: ICD-10-PCS | Mod: S$GLB,,, | Performed by: INTERNAL MEDICINE

## 2022-04-12 PROCEDURE — 1159F MED LIST DOCD IN RCRD: CPT | Mod: CPTII,S$GLB,, | Performed by: INTERNAL MEDICINE

## 2022-04-12 NOTE — PROGRESS NOTES
"Subjective:       Patient ID: Les Whitman III is a 31 y.o. male.    Chief Complaint: Annual Exam    Here for annual exam      Had another episode of palpitations and anxiety. This was surrounding stressful event. Otherwise stress levels manageable with zoloft 50mg.     Using albuterol a little more than normal since moving into his girlfriends mother's home. She has cats which he is allergic too. Her mother recently passed after long arthur. Now that they are doing less renovations to home and carpet removal his asthma is improving and albuterol use is much less.      --Chest pain/Arm pain---  08/2020 Left sided CP while around smoker and subsequent dizziness. Murmur on exam leads to ECHO.  Throbbing tightness of left chest and left arm.   08/17/20 ECHO "Local segmental wall motion abnormalities: Moderate hypokinesia of mid and apical septum."  9/21/20 Exercise Stress test- normal, exercise capacity excellent.   10/27/20 Cardiac MRI-normal  12/15/20 Another episode of similar chest pain, 10 minutes after smoking marijuana, that was left axillary radiating down arm near medial epicondyle and ending palmar surface proximal to 5th finger. No color changes. No weakness. No trauma. No taking anything currently for symptoms.    4/12/22 No recurrence.       Review of Systems   Constitutional: Negative for appetite change, chills, fever and unexpected weight change.   HENT: Negative for hearing loss, sore throat and trouble swallowing.    Eyes: Negative for visual disturbance.   Respiratory: Negative for cough, chest tightness and shortness of breath.    Cardiovascular: Negative for chest pain and leg swelling.   Gastrointestinal: Negative for abdominal pain, blood in stool, constipation, diarrhea, nausea and vomiting.   Endocrine: Negative for polydipsia and polyuria.   Genitourinary: Negative for decreased urine volume, difficulty urinating, dysuria, frequency and urgency.   Musculoskeletal: Negative for gait problem. " "  Skin: Negative for rash.   Neurological: Negative for dizziness and numbness.   Psychiatric/Behavioral: The patient is not nervous/anxious.        Objective:      Vitals:    04/12/22 1543   BP: 112/74   Pulse: 74   SpO2: 99%   Weight: 62.9 kg (138 lb 10.7 oz)   Height: 5' 10" (1.778 m)      Physical Exam  Constitutional:       General: He is not in acute distress.     Appearance: He is well-developed.   HENT:      Head: Normocephalic and atraumatic.      Mouth/Throat:      Pharynx: No oropharyngeal exudate.   Eyes:      General: No scleral icterus.     Conjunctiva/sclera: Conjunctivae normal.      Pupils: Pupils are equal, round, and reactive to light.   Neck:      Thyroid: No thyromegaly.   Cardiovascular:      Rate and Rhythm: Normal rate and regular rhythm.      Heart sounds: Normal heart sounds. No murmur heard.  Pulmonary:      Effort: Pulmonary effort is normal.      Breath sounds: Normal breath sounds. No wheezing or rales.   Abdominal:      General: There is no distension.      Palpations: Abdomen is soft.      Tenderness: There is no abdominal tenderness.   Musculoskeletal:         General: No tenderness.   Lymphadenopathy:      Cervical: No cervical adenopathy.   Skin:     General: Skin is warm and dry.   Neurological:      Mental Status: He is alert and oriented to person, place, and time.   Psychiatric:         Behavior: Behavior normal.         Assessment:       1. Annual physical exam    2. Palpitations    3. Screening for HIV without presence of risk factors    4. Encounter for hepatitis C screening test for low risk patient        Plan:       Les was seen today for annual exam.    Diagnoses and all orders for this visit:    Annual physical exam  -     Comprehensive Metabolic Panel; Future  -     Lipid Panel; Future  -     TSH; Future  -     CBC Auto Differential; Future  -     Hemoglobin A1C; Future    Palpitations    Screening for HIV without presence of risk factors  -     HIV 1/2 Ag/Ab (4th " Gen); Future    Encounter for hepatitis C screening test for low risk patient  -     Hepatitis C Antibody; Future           Rubens Horne MD  Internal Medicine-[facility]        Side effects of medication(s) were discussed in detail and patient voiced understanding.  Patient will call back for any issues or complications.

## 2022-04-13 LAB
HCV AB SERPL QL IA: NEGATIVE
HIV 1+2 AB+HIV1 P24 AG SERPL QL IA: NEGATIVE

## 2022-05-06 ENCOUNTER — OFFICE VISIT (OUTPATIENT)
Dept: DERMATOLOGY | Facility: CLINIC | Age: 32
End: 2022-05-06
Payer: COMMERCIAL

## 2022-05-06 DIAGNOSIS — L82.1 SK (SEBORRHEIC KERATOSIS): Primary | ICD-10-CM

## 2022-05-06 DIAGNOSIS — L72.0 EIC (EPIDERMAL INCLUSION CYST): ICD-10-CM

## 2022-05-06 DIAGNOSIS — D18.01 CHERRY ANGIOMA: ICD-10-CM

## 2022-05-06 PROCEDURE — 1159F PR MEDICATION LIST DOCUMENTED IN MEDICAL RECORD: ICD-10-PCS | Mod: CPTII,S$GLB,, | Performed by: DERMATOLOGY

## 2022-05-06 PROCEDURE — 3044F HG A1C LEVEL LT 7.0%: CPT | Mod: CPTII,S$GLB,, | Performed by: DERMATOLOGY

## 2022-05-06 PROCEDURE — 99212 OFFICE O/P EST SF 10 MIN: CPT | Mod: S$GLB,,, | Performed by: DERMATOLOGY

## 2022-05-06 PROCEDURE — 3044F PR MOST RECENT HEMOGLOBIN A1C LEVEL <7.0%: ICD-10-PCS | Mod: CPTII,S$GLB,, | Performed by: DERMATOLOGY

## 2022-05-06 PROCEDURE — 1160F RVW MEDS BY RX/DR IN RCRD: CPT | Mod: CPTII,S$GLB,, | Performed by: DERMATOLOGY

## 2022-05-06 PROCEDURE — 99212 PR OFFICE/OUTPT VISIT, EST, LEVL II, 10-19 MIN: ICD-10-PCS | Mod: S$GLB,,, | Performed by: DERMATOLOGY

## 2022-05-06 PROCEDURE — 1160F PR REVIEW ALL MEDS BY PRESCRIBER/CLIN PHARMACIST DOCUMENTED: ICD-10-PCS | Mod: CPTII,S$GLB,, | Performed by: DERMATOLOGY

## 2022-05-06 PROCEDURE — 1159F MED LIST DOCD IN RCRD: CPT | Mod: CPTII,S$GLB,, | Performed by: DERMATOLOGY

## 2022-05-06 PROCEDURE — 99999 PR PBB SHADOW E&M-EST. PATIENT-LVL III: CPT | Mod: PBBFAC,,, | Performed by: DERMATOLOGY

## 2022-05-06 PROCEDURE — 99999 PR PBB SHADOW E&M-EST. PATIENT-LVL III: ICD-10-PCS | Mod: PBBFAC,,, | Performed by: DERMATOLOGY

## 2022-05-06 NOTE — PROGRESS NOTES
Subjective:       Patient ID:  Les Whitman III is a 31 y.o. male who presents for   Chief Complaint   Patient presents with    Warts     Pt here today for a follow up for warts on left hand tx with cryosurgery.     Warts    States the site has improved since last visit and he hasn't noticed any new similar spots.    Has a few other growths on skin that he forgot to ask about last visit. None are symptomatic or changing.    Review of Systems   Constitutional: Negative for fever and chills.   Skin: Negative for itching and rash.        Objective:    Physical Exam   Constitutional: He appears well-developed and well-nourished. No distress.   Neurological: He is alert and oriented to person, place, and time. He is not disoriented.   Psychiatric: He has a normal mood and affect.   Skin:   Areas Examined (abnormalities noted in diagram):   Head / Face Inspection Performed  RUE Inspected  LUE Inspection Performed  LLE Inspection Performed                       Diagram Legend     Erythematous scaling macule/papule c/w actinic keratosis       Vascular papule c/w angioma      Pigmented verrucoid papule/plaque c/w seborrheic keratosis      Yellow umbilicated papule c/w sebaceous hyperplasia      Irregularly shaped tan macule c/w lentigo     1-2 mm smooth white papules consistent with Milia      Movable subcutaneous cyst with punctum c/w epidermal inclusion cyst      Subcutaneous movable cyst c/w pilar cyst      Firm pink to brown papule c/w dermatofibroma      Pedunculated fleshy papule(s) c/w skin tag(s)      Evenly pigmented macule c/w junctional nevus     Mildly variegated pigmented, slightly irregular-bordered macule c/w mildly atypical nevus      Flesh colored to evenly pigmented papule c/w intradermal nevus       Pink pearly papule/plaque c/w basal cell carcinoma      Erythematous hyperkeratotic cursted plaque c/w SCC      Surgical scar with no sign of skin cancer recurrence      Open and closed comedones       Inflammatory papules and pustules      Verrucoid papule consistent consistent with wart     Erythematous eczematous patches and plaques     Dystrophic onycholytic nail with subungual debris c/w onychomycosis     Umbilicated papule    Erythematous-base heme-crusted tan verrucoid plaque consistent with inflamed seborrheic keratosis     Erythematous Silvery Scaling Plaque c/w Psoriasis     See annotation      Assessment / Plan:        SK (seborrheic keratosis)  These are benign inherited growths without a malignant potential. Reassurance given to patient. No treatment is necessary.   Treatment of benign, asymptomatic lesions may be considered cosmetic.  Warned about risk of hypo- or hyperpigmentation with treatment and risk of recurrence.      EIC (epidermal inclusion cyst)  This is a benign cyst of the hair follicle. Reassurance provided. No treatment is necessary unless it is symptomatic.     Cherry angioma  This is a benign vascular lesion. Reassurance given. No treatment required. Treatment of benign, asymptomatic lesions may be considered cosmetic.    No recurrence of warts today.    Follow up in about 1 year (around 5/6/2023) for skin check or sooner for any concerns.

## 2022-05-06 NOTE — PATIENT INSTRUCTIONS

## 2022-10-03 DIAGNOSIS — F41.9 ANXIETY: Primary | ICD-10-CM

## 2022-10-03 RX ORDER — SERTRALINE HYDROCHLORIDE 50 MG/1
50 TABLET, FILM COATED ORAL DAILY
Qty: 90 TABLET | Refills: 2 | Status: SHIPPED | OUTPATIENT
Start: 2022-10-03 | End: 2023-10-04 | Stop reason: SDUPTHER

## 2022-10-03 NOTE — TELEPHONE ENCOUNTER
Faxed Prescription Refill Request received from  [facility] for Sertaline 50 mg daily                             LOV   04/12/2022                            Thank You

## 2022-10-03 NOTE — TELEPHONE ENCOUNTER
No new care gaps identified.  [facility] Embedded Care Gaps. Reference number: [id number]. 10/03/2022   8:28:05 AM MAGGYT

## 2023-01-25 ENCOUNTER — PATIENT MESSAGE (OUTPATIENT)
Dept: INTERNAL MEDICINE | Facility: CLINIC | Age: 33
End: 2023-01-25

## 2023-01-25 ENCOUNTER — TELEPHONE (OUTPATIENT)
Dept: INTERNAL MEDICINE | Facility: CLINIC | Age: 33
End: 2023-01-25
Payer: COMMERCIAL

## 2023-01-25 ENCOUNTER — LAB VISIT (OUTPATIENT)
Dept: LAB | Facility: OTHER | Age: 33
End: 2023-01-25
Attending: INTERNAL MEDICINE
Payer: COMMERCIAL

## 2023-01-25 ENCOUNTER — OFFICE VISIT (OUTPATIENT)
Dept: INTERNAL MEDICINE | Facility: CLINIC | Age: 33
End: 2023-01-25
Attending: INTERNAL MEDICINE
Payer: COMMERCIAL

## 2023-01-25 VITALS
OXYGEN SATURATION: 98 % | WEIGHT: 136 LBS | BODY MASS INDEX: 19.47 KG/M2 | DIASTOLIC BLOOD PRESSURE: 88 MMHG | HEART RATE: 67 BPM | HEIGHT: 70 IN | SYSTOLIC BLOOD PRESSURE: 140 MMHG

## 2023-01-25 DIAGNOSIS — Z00.00 ANNUAL PHYSICAL EXAM: ICD-10-CM

## 2023-01-25 DIAGNOSIS — J45.20 MILD INTERMITTENT REACTIVE AIRWAY DISEASE WITHOUT COMPLICATION: ICD-10-CM

## 2023-01-25 DIAGNOSIS — J45.20 MILD INTERMITTENT ASTHMA WITHOUT COMPLICATION: ICD-10-CM

## 2023-01-25 DIAGNOSIS — Z00.00 ANNUAL PHYSICAL EXAM: Primary | ICD-10-CM

## 2023-01-25 DIAGNOSIS — J30.9 ALLERGIC RHINITIS, UNSPECIFIED SEASONALITY, UNSPECIFIED TRIGGER: ICD-10-CM

## 2023-01-25 LAB
ALBUMIN SERPL BCP-MCNC: 5 G/DL (ref 3.5–5.2)
ALP SERPL-CCNC: 92 U/L (ref 55–135)
ALT SERPL W/O P-5'-P-CCNC: 17 U/L (ref 10–44)
ANION GAP SERPL CALC-SCNC: 8 MMOL/L (ref 8–16)
AST SERPL-CCNC: 17 U/L (ref 10–40)
BASOPHILS # BLD AUTO: 0.02 K/UL (ref 0–0.2)
BASOPHILS NFR BLD: 0.3 % (ref 0–1.9)
BILIRUB SERPL-MCNC: 0.4 MG/DL (ref 0.1–1)
BUN SERPL-MCNC: 10 MG/DL (ref 6–20)
CALCIUM SERPL-MCNC: 10.4 MG/DL (ref 8.7–10.5)
CHLORIDE SERPL-SCNC: 104 MMOL/L (ref 95–110)
CHOLEST SERPL-MCNC: 153 MG/DL (ref 120–199)
CHOLEST/HDLC SERPL: 2.7 {RATIO} (ref 2–5)
CO2 SERPL-SCNC: 28 MMOL/L (ref 23–29)
CREAT SERPL-MCNC: 0.9 MG/DL (ref 0.5–1.4)
DIFFERENTIAL METHOD: ABNORMAL
EOSINOPHIL # BLD AUTO: 0.1 K/UL (ref 0–0.5)
EOSINOPHIL NFR BLD: 1.3 % (ref 0–8)
ERYTHROCYTE [DISTWIDTH] IN BLOOD BY AUTOMATED COUNT: 11.3 % (ref 11.5–14.5)
EST. GFR  (NO RACE VARIABLE): >60 ML/MIN/1.73 M^2
ESTIMATED AVG GLUCOSE: 103 MG/DL (ref 68–131)
GLUCOSE SERPL-MCNC: 100 MG/DL (ref 70–110)
HBA1C MFR BLD: 5.2 % (ref 4–5.6)
HCT VFR BLD AUTO: 43.9 % (ref 40–54)
HDLC SERPL-MCNC: 57 MG/DL (ref 40–75)
HDLC SERPL: 37.3 % (ref 20–50)
HGB BLD-MCNC: 15.2 G/DL (ref 14–18)
IMM GRANULOCYTES # BLD AUTO: 0.02 K/UL (ref 0–0.04)
IMM GRANULOCYTES NFR BLD AUTO: 0.3 % (ref 0–0.5)
LDLC SERPL CALC-MCNC: 82.8 MG/DL (ref 63–159)
LYMPHOCYTES # BLD AUTO: 1.5 K/UL (ref 1–4.8)
LYMPHOCYTES NFR BLD: 22 % (ref 18–48)
MCH RBC QN AUTO: 31.7 PG (ref 27–31)
MCHC RBC AUTO-ENTMCNC: 34.6 G/DL (ref 32–36)
MCV RBC AUTO: 92 FL (ref 82–98)
MONOCYTES # BLD AUTO: 0.8 K/UL (ref 0.3–1)
MONOCYTES NFR BLD: 12 % (ref 4–15)
NEUTROPHILS # BLD AUTO: 4.4 K/UL (ref 1.8–7.7)
NEUTROPHILS NFR BLD: 64.1 % (ref 38–73)
NONHDLC SERPL-MCNC: 96 MG/DL
NRBC BLD-RTO: 0 /100 WBC
PLATELET # BLD AUTO: 217 K/UL (ref 150–450)
PMV BLD AUTO: 9.8 FL (ref 9.2–12.9)
POTASSIUM SERPL-SCNC: 4.3 MMOL/L (ref 3.5–5.1)
PROT SERPL-MCNC: 8.3 G/DL (ref 6–8.4)
RBC # BLD AUTO: 4.8 M/UL (ref 4.6–6.2)
SODIUM SERPL-SCNC: 140 MMOL/L (ref 136–145)
TRIGL SERPL-MCNC: 66 MG/DL (ref 30–150)
TSH SERPL DL<=0.005 MIU/L-ACNC: 2.75 UIU/ML (ref 0.4–4)
WBC # BLD AUTO: 6.92 K/UL (ref 3.9–12.7)

## 2023-01-25 PROCEDURE — 80061 LIPID PANEL: CPT | Performed by: INTERNAL MEDICINE

## 2023-01-25 PROCEDURE — 3079F PR MOST RECENT DIASTOLIC BLOOD PRESSURE 80-89 MM HG: ICD-10-PCS | Mod: CPTII,S$GLB,, | Performed by: INTERNAL MEDICINE

## 2023-01-25 PROCEDURE — 80053 COMPREHEN METABOLIC PANEL: CPT | Performed by: INTERNAL MEDICINE

## 2023-01-25 PROCEDURE — 1159F PR MEDICATION LIST DOCUMENTED IN MEDICAL RECORD: ICD-10-PCS | Mod: CPTII,S$GLB,, | Performed by: INTERNAL MEDICINE

## 2023-01-25 PROCEDURE — 99395 PREV VISIT EST AGE 18-39: CPT | Mod: S$GLB,,, | Performed by: INTERNAL MEDICINE

## 2023-01-25 PROCEDURE — 99999 PR PBB SHADOW E&M-EST. PATIENT-LVL III: ICD-10-PCS | Mod: PBBFAC,,, | Performed by: INTERNAL MEDICINE

## 2023-01-25 PROCEDURE — 3044F HG A1C LEVEL LT 7.0%: CPT | Mod: CPTII,S$GLB,, | Performed by: INTERNAL MEDICINE

## 2023-01-25 PROCEDURE — 83036 HEMOGLOBIN GLYCOSYLATED A1C: CPT | Performed by: INTERNAL MEDICINE

## 2023-01-25 PROCEDURE — 36415 COLL VENOUS BLD VENIPUNCTURE: CPT | Performed by: INTERNAL MEDICINE

## 2023-01-25 PROCEDURE — 3008F BODY MASS INDEX DOCD: CPT | Mod: CPTII,S$GLB,, | Performed by: INTERNAL MEDICINE

## 2023-01-25 PROCEDURE — 3079F DIAST BP 80-89 MM HG: CPT | Mod: CPTII,S$GLB,, | Performed by: INTERNAL MEDICINE

## 2023-01-25 PROCEDURE — 99999 PR PBB SHADOW E&M-EST. PATIENT-LVL III: CPT | Mod: PBBFAC,,, | Performed by: INTERNAL MEDICINE

## 2023-01-25 PROCEDURE — 99395 PR PREVENTIVE VISIT,EST,18-39: ICD-10-PCS | Mod: S$GLB,,, | Performed by: INTERNAL MEDICINE

## 2023-01-25 PROCEDURE — 3044F PR MOST RECENT HEMOGLOBIN A1C LEVEL <7.0%: ICD-10-PCS | Mod: CPTII,S$GLB,, | Performed by: INTERNAL MEDICINE

## 2023-01-25 PROCEDURE — 1160F RVW MEDS BY RX/DR IN RCRD: CPT | Mod: CPTII,S$GLB,, | Performed by: INTERNAL MEDICINE

## 2023-01-25 PROCEDURE — 85025 COMPLETE CBC W/AUTO DIFF WBC: CPT | Performed by: INTERNAL MEDICINE

## 2023-01-25 PROCEDURE — 3077F SYST BP >= 140 MM HG: CPT | Mod: CPTII,S$GLB,, | Performed by: INTERNAL MEDICINE

## 2023-01-25 PROCEDURE — 1160F PR REVIEW ALL MEDS BY PRESCRIBER/CLIN PHARMACIST DOCUMENTED: ICD-10-PCS | Mod: CPTII,S$GLB,, | Performed by: INTERNAL MEDICINE

## 2023-01-25 PROCEDURE — 3008F PR BODY MASS INDEX (BMI) DOCUMENTED: ICD-10-PCS | Mod: CPTII,S$GLB,, | Performed by: INTERNAL MEDICINE

## 2023-01-25 PROCEDURE — 84443 ASSAY THYROID STIM HORMONE: CPT | Performed by: INTERNAL MEDICINE

## 2023-01-25 PROCEDURE — 1159F MED LIST DOCD IN RCRD: CPT | Mod: CPTII,S$GLB,, | Performed by: INTERNAL MEDICINE

## 2023-01-25 PROCEDURE — 3077F PR MOST RECENT SYSTOLIC BLOOD PRESSURE >= 140 MM HG: ICD-10-PCS | Mod: CPTII,S$GLB,, | Performed by: INTERNAL MEDICINE

## 2023-01-25 RX ORDER — AZELASTINE 1 MG/ML
1 SPRAY, METERED NASAL 2 TIMES DAILY
Qty: 30 ML | Refills: 5 | Status: SHIPPED | OUTPATIENT
Start: 2023-01-25 | End: 2024-01-25

## 2023-01-25 RX ORDER — ALBUTEROL SULFATE 90 UG/1
2 AEROSOL, METERED RESPIRATORY (INHALATION) EVERY 6 HOURS PRN
Qty: 18 G | Refills: 1 | Status: SHIPPED | OUTPATIENT
Start: 2023-01-25

## 2023-01-25 NOTE — PROGRESS NOTES
"Subjective:       Patient ID: Les Whitman III is a 32 y.o. male.    Chief Complaint: Panic Attack    Here for f/u annual       ### Anxiety ###  Back in September reduced his zoloft and then 2.5 months later symptoms returned.   Some dizziness and SOB and dizziness within 5 days of restarting.       Review of Systems   Constitutional:  Negative for appetite change, chills, fever and unexpected weight change.   HENT:  Negative for hearing loss, sore throat and trouble swallowing.    Eyes:  Negative for visual disturbance.   Respiratory:  Negative for cough, chest tightness and shortness of breath.    Cardiovascular:  Negative for chest pain and leg swelling.   Gastrointestinal:  Negative for abdominal pain, blood in stool, constipation, diarrhea, nausea and vomiting.   Endocrine: Negative for polydipsia and polyuria.   Genitourinary:  Negative for decreased urine volume, difficulty urinating, dysuria, frequency and urgency.   Musculoskeletal:  Negative for gait problem.   Skin:  Negative for rash.   Neurological:  Negative for dizziness and numbness.   Psychiatric/Behavioral:  The patient is not nervous/anxious.      Objective:      Vitals:    01/25/23 1543 01/25/23 1602   BP: (!) 140/72 (!) 140/88   Pulse: 67    SpO2: 98%    Weight: 61.7 kg (136 lb 0.4 oz)    Height: 5' 10" (1.778 m)       Physical Exam  Vitals and nursing note reviewed.   Constitutional:       General: He is not in acute distress.     Appearance: Normal appearance. He is well-developed.   HENT:      Head: Normocephalic and atraumatic.      Mouth/Throat:      Pharynx: No oropharyngeal exudate.   Eyes:      General: No scleral icterus.     Conjunctiva/sclera: Conjunctivae normal.      Pupils: Pupils are equal, round, and reactive to light.   Neck:      Thyroid: No thyromegaly.   Cardiovascular:      Rate and Rhythm: Normal rate and regular rhythm.      Heart sounds: Normal heart sounds. No murmur heard.  Pulmonary:      Effort: Pulmonary effort is " normal.      Breath sounds: Normal breath sounds. No wheezing or rales.   Abdominal:      General: There is no distension.   Musculoskeletal:         General: No tenderness.   Lymphadenopathy:      Cervical: No cervical adenopathy.   Skin:     General: Skin is warm and dry.   Neurological:      Mental Status: He is alert and oriented to person, place, and time.   Psychiatric:         Behavior: Behavior normal.       Assessment:       1. Annual physical exam    2. Mild intermittent asthma with acute exacerbation    3. Allergic rhinitis, unspecified seasonality, unspecified trigger    4. Mild intermittent reactive airway disease without complication        Plan:       Les was seen today for panic attack.    Diagnoses and all orders for this visit:    Annual physical exam  -     Comprehensive Metabolic Panel; Future  -     Lipid Panel; Future  -     TSH; Future  -     CBC Auto Differential; Future  -     Hemoglobin A1C; Future    Mild intermittent asthma without acute exacerbation   Controlled and asymptomatic.  Continue current Rx regimen.  -     albuterol (PROVENTIL/VENTOLIN HFA) 90 mcg/actuation inhaler; Inhale 2 puffs into the lungs every 6 (six) hours as needed for Wheezing.    Allergic rhinitis, unspecified seasonality, unspecified trigger  -     azelastine (ASTELIN) 137 mcg (0.1 %) nasal spray; 1 spray (137 mcg total) by Nasal route 2 (two) times daily.    Mild intermittent reactive airway disease without complication               Rubens Horne MD  Internal Medicine-[facility]        Side effects of medication(s) were discussed in detail and patient voiced understanding.  Patient will call back for any issues or complications.

## 2023-01-25 NOTE — TELEPHONE ENCOUNTER
Spoke with pt stating message below:    Dr. Horne's schedule today allows him to offer you to come in early if you would like. He is in no way asking you to come in early, especially if your schedule does not allow. We just wanted to let you know you are welcome to come in anytime between now and your scheduled time if this works out better for you. We will see you when you get here. If you are unable to come to today's appointment please notify us as soon as possible.     Pt states he'll arrive at 4pm still due to work schedule

## 2023-02-01 ENCOUNTER — CLINICAL SUPPORT (OUTPATIENT)
Dept: INTERNAL MEDICINE | Facility: CLINIC | Age: 33
End: 2023-02-01
Payer: COMMERCIAL

## 2023-02-01 ENCOUNTER — TELEPHONE (OUTPATIENT)
Dept: INTERNAL MEDICINE | Facility: CLINIC | Age: 33
End: 2023-02-01

## 2023-02-01 VITALS — DIASTOLIC BLOOD PRESSURE: 84 MMHG | SYSTOLIC BLOOD PRESSURE: 122 MMHG | HEART RATE: 80 BPM | OXYGEN SATURATION: 99 %

## 2023-02-01 PROCEDURE — 99999 PR PBB SHADOW E&M-EST. PATIENT-LVL III: CPT | Mod: PBBFAC,,,

## 2023-02-01 PROCEDURE — 99999 PR PBB SHADOW E&M-EST. PATIENT-LVL III: ICD-10-PCS | Mod: PBBFAC,,,

## 2023-02-01 NOTE — PROGRESS NOTES
Les Whitman III 32 y.o. male is here for Blood Pressure check. in person    Manual Blood pressure reading was  142/88, Pulse 64. (Checked at the end of the visit)    If high, was it repeated after 15 minutes? yes /84 Pulse 80    Pt's Home blood pressure machine read in office NO, Pulse N/A.     Diagnosed with Hypertension no.    Patient took blood pressure medication today no.  Last dose of blood pressure medication was taken at Not on BP medication. Patient took None.     All Medications and OTC medication updated yes    Does patient have record of home blood pressure readings / Blood Pressure Log no.     Does the pt have any complaints today in regards to their blood pressure medication? no.    Not on BP medication.      Complains of None. Patient is asymptomatic.     Were you sitting still for 5-10 minutes prior to taking your Blood pressure? yes Today in clinic  Has your blood pressure monitor ever been checked? no Does not have a BP machine When was last time we checked your blood pressure monitor? NO  Given BP log sheet that has information on BP ranges   Updated vitals yes  Follow up date is Has No Follow up Appointment.   Dr. Holliday  notified.   Creatinine   Date Value Ref Range Status   01/25/2023 0.9 0.5 - 1.4 mg/dL Final     Sodium   Date Value Ref Range Status   01/25/2023 140 136 - 145 mmol/L Final     Potassium   Date Value Ref Range Status   01/25/2023 4.3 3.5 - 5.1 mmol/L Final

## 2023-02-02 ENCOUNTER — TELEPHONE (OUTPATIENT)
Dept: INTERNAL MEDICINE | Facility: CLINIC | Age: 33
End: 2023-02-02
Payer: COMMERCIAL

## 2023-02-02 NOTE — TELEPHONE ENCOUNTER
Completing a prior authorization for patient medication listed below. Unable to send to plan without signed OV. Please sign OV 1/25/23. Thanks in advance. Routed to Dr. Holliday for signature.         albuterol (PROVENTIL/VENTOLIN HFA) 90 mcg/actuation inhaler 18 g 1 1/25/2023  No   Sig - Route: Inhale 2 puffs into the lungs every 6 (six) hours as needed for Wheezing. - Inhalation   Sent to pharmacy as: albuterol (PROVENTIL/VENTOLIN HFA) 90 mcg/actuation inhaler   Class: Normal   Order: [id number]   Date/Time Signed: 1/25/2023 15:57       E-Prescribing Status: Receipt confirmed by pharmacy (1/25/2023  3:57 PM CST)   Prior authorization: Payer Waiting for Response     Pharmacy    [id number] - [place], [place] - [address] AT [facility] [address]    Associated Diagnoses    Mild intermittent asthma with acute exacerbation

## 2023-10-04 DIAGNOSIS — F41.9 ANXIETY: ICD-10-CM

## 2023-10-04 NOTE — TELEPHONE ENCOUNTER
No care due was identified.  [facility] Embedded Care Due Messages. Reference number: [id number].   10/04/2023 2:19:15 PM CDT

## 2023-10-05 RX ORDER — SERTRALINE HYDROCHLORIDE 50 MG/1
50 TABLET, FILM COATED ORAL DAILY
Qty: 90 TABLET | Refills: 2 | Status: SHIPPED | OUTPATIENT
Start: 2023-10-05 | End: 2024-10-04

## 2023-10-26 ENCOUNTER — OFFICE VISIT (OUTPATIENT)
Dept: DERMATOLOGY | Facility: CLINIC | Age: 33
End: 2023-10-26
Payer: COMMERCIAL

## 2023-10-26 DIAGNOSIS — D23.9 DERMATOFIBROMA: ICD-10-CM

## 2023-10-26 DIAGNOSIS — Z12.83 SCREENING EXAM FOR SKIN CANCER: ICD-10-CM

## 2023-10-26 DIAGNOSIS — B07.9 VERRUCA VULGARIS: Primary | ICD-10-CM

## 2023-10-26 DIAGNOSIS — D22.9 MULTIPLE BENIGN NEVI: ICD-10-CM

## 2023-10-26 DIAGNOSIS — L21.9 SEBORRHEIC DERMATITIS: ICD-10-CM

## 2023-10-26 DIAGNOSIS — L73.9 FOLLICULITIS: ICD-10-CM

## 2023-10-26 PROCEDURE — 17110 DESTRUCTION B9 LES UP TO 14: CPT | Mod: S$GLB,,, | Performed by: STUDENT IN AN ORGANIZED HEALTH CARE EDUCATION/TRAINING PROGRAM

## 2023-10-26 PROCEDURE — 99999 PR PBB SHADOW E&M-EST. PATIENT-LVL III: ICD-10-PCS | Mod: PBBFAC,,, | Performed by: STUDENT IN AN ORGANIZED HEALTH CARE EDUCATION/TRAINING PROGRAM

## 2023-10-26 PROCEDURE — 1160F RVW MEDS BY RX/DR IN RCRD: CPT | Mod: CPTII,S$GLB,, | Performed by: STUDENT IN AN ORGANIZED HEALTH CARE EDUCATION/TRAINING PROGRAM

## 2023-10-26 PROCEDURE — 99213 OFFICE O/P EST LOW 20 MIN: CPT | Mod: 25,S$GLB,, | Performed by: STUDENT IN AN ORGANIZED HEALTH CARE EDUCATION/TRAINING PROGRAM

## 2023-10-26 PROCEDURE — 1159F PR MEDICATION LIST DOCUMENTED IN MEDICAL RECORD: ICD-10-PCS | Mod: CPTII,S$GLB,, | Performed by: STUDENT IN AN ORGANIZED HEALTH CARE EDUCATION/TRAINING PROGRAM

## 2023-10-26 PROCEDURE — 17110 PR DESTRUCTION BENIGN LESIONS UP TO 14: ICD-10-PCS | Mod: S$GLB,,, | Performed by: STUDENT IN AN ORGANIZED HEALTH CARE EDUCATION/TRAINING PROGRAM

## 2023-10-26 PROCEDURE — 3044F HG A1C LEVEL LT 7.0%: CPT | Mod: CPTII,S$GLB,, | Performed by: STUDENT IN AN ORGANIZED HEALTH CARE EDUCATION/TRAINING PROGRAM

## 2023-10-26 PROCEDURE — 1159F MED LIST DOCD IN RCRD: CPT | Mod: CPTII,S$GLB,, | Performed by: STUDENT IN AN ORGANIZED HEALTH CARE EDUCATION/TRAINING PROGRAM

## 2023-10-26 PROCEDURE — 99213 PR OFFICE/OUTPT VISIT, EST, LEVL III, 20-29 MIN: ICD-10-PCS | Mod: 25,S$GLB,, | Performed by: STUDENT IN AN ORGANIZED HEALTH CARE EDUCATION/TRAINING PROGRAM

## 2023-10-26 PROCEDURE — 3044F PR MOST RECENT HEMOGLOBIN A1C LEVEL <7.0%: ICD-10-PCS | Mod: CPTII,S$GLB,, | Performed by: STUDENT IN AN ORGANIZED HEALTH CARE EDUCATION/TRAINING PROGRAM

## 2023-10-26 PROCEDURE — 99999 PR PBB SHADOW E&M-EST. PATIENT-LVL III: CPT | Mod: PBBFAC,,, | Performed by: STUDENT IN AN ORGANIZED HEALTH CARE EDUCATION/TRAINING PROGRAM

## 2023-10-26 PROCEDURE — 1160F PR REVIEW ALL MEDS BY PRESCRIBER/CLIN PHARMACIST DOCUMENTED: ICD-10-PCS | Mod: CPTII,S$GLB,, | Performed by: STUDENT IN AN ORGANIZED HEALTH CARE EDUCATION/TRAINING PROGRAM

## 2023-10-26 NOTE — PROGRESS NOTES
Subjective:      Patient ID:  Les Whitman III is a 33 y.o. male who presents for   Chief Complaint   Patient presents with    Skin Check     UBSE     Pt here for UBSE.  No hx.  Last seen 5/06/22 for warts treated with cryosurgery and resolved.        Gets pimples on buttocks and thighs. Uses BPO cleanser    Review of Systems   Skin:  Positive for daily sunscreen use (face), activity-related sunscreen use and wears hat (activity).   Hematologic/Lymphatic: Does not bruise/bleed easily.       Objective:   Physical Exam   Constitutional: He appears well-developed and well-nourished. No distress.   Neurological: He is alert and oriented to person, place, and time. He is not disoriented.   Psychiatric: He has a normal mood and affect.   Skin:   Areas Examined (abnormalities noted in diagram):   Scalp / Hair Palpated and Inspected  Head / Face Inspection Performed  Neck Inspection Performed  Chest / Axilla Inspection Performed  Abdomen Inspection Performed  Back Inspection Performed  RUE Inspected  LUE Inspection Performed  Nails and Digits Inspection Performed                     Diagram Legend     Erythematous scaling macule/papule c/w actinic keratosis       Vascular papule c/w angioma      Pigmented verrucoid papule/plaque c/w seborrheic keratosis      Yellow umbilicated papule c/w sebaceous hyperplasia      Irregularly shaped tan macule c/w lentigo     1-2 mm smooth white papules consistent with Milia      Movable subcutaneous cyst with punctum c/w epidermal inclusion cyst      Subcutaneous movable cyst c/w pilar cyst      Firm pink to brown papule c/w dermatofibroma      Pedunculated fleshy papule(s) c/w skin tag(s)      Evenly pigmented macule c/w junctional nevus     Mildly variegated pigmented, slightly irregular-bordered macule c/w mildly atypical nevus      Flesh colored to evenly pigmented papule c/w intradermal nevus       Pink pearly papule/plaque c/w basal cell carcinoma      Erythematous  hyperkeratotic cursted plaque c/w SCC      Surgical scar with no sign of skin cancer recurrence      Open and closed comedones      Inflammatory papules and pustules      Verrucoid papule consistent consistent with wart     Erythematous eczematous patches and plaques     Dystrophic onycholytic nail with subungual debris c/w onychomycosis     Umbilicated papule    Erythematous-base heme-crusted tan verrucoid plaque consistent with inflamed seborrheic keratosis     Erythematous Silvery Scaling Plaque c/w Psoriasis     See annotation      Assessment / Plan:        Verruca vulgaris  Cryosurgery procedure note:    Verbal consent from the patient is obtained including, but not limited to, risk of hypopigmentation/hyperpigmentation, scar, recurrence of lesion. Liquid nitrogen cryosurgery is applied to 1 lesions to produce a freeze injury. The patient is aware that blisters may form and is instructed on wound care with gentle cleansing and use of vaseline ointment to keep moist until healed. The patient is supplied a handout on cryosurgery and is instructed to call if lesions do not completely resolve.    Screening exam for skin cancer  Upper body skin examination performed today including at least 6 points as noted in physical examination. No lesions suspicious for malignancy noted.    Recommend daily sun protection/avoidance and use of at least SPF 30, broad spectrum sunscreen (OTC drug).     Multiple benign nevi  Dermatofibroma  Reassurance given to patient. No treatment is necessary.   Treatment of benign, asymptomatic lesions may be considered cosmetic.    Folliculitis  - c/w OTC BPO cleanser  - can also use replenix 10-2 gly/sal pads or spray    Seborrheic dermatitis  - well controlled with OTC dandruff shampoo--continue           Follow up if symptoms worsen or fail to improve.

## 2024-05-22 ENCOUNTER — LAB VISIT (OUTPATIENT)
Dept: LAB | Facility: OTHER | Age: 34
End: 2024-05-22
Attending: INTERNAL MEDICINE
Payer: COMMERCIAL

## 2024-05-22 ENCOUNTER — PATIENT MESSAGE (OUTPATIENT)
Dept: INTERNAL MEDICINE | Facility: CLINIC | Age: 34
End: 2024-05-22

## 2024-05-22 ENCOUNTER — OFFICE VISIT (OUTPATIENT)
Dept: INTERNAL MEDICINE | Facility: CLINIC | Age: 34
End: 2024-05-22
Attending: INTERNAL MEDICINE
Payer: COMMERCIAL

## 2024-05-22 VITALS
DIASTOLIC BLOOD PRESSURE: 80 MMHG | SYSTOLIC BLOOD PRESSURE: 134 MMHG | HEIGHT: 70 IN | WEIGHT: 164.88 LBS | HEART RATE: 76 BPM | OXYGEN SATURATION: 99 % | BODY MASS INDEX: 23.6 KG/M2

## 2024-05-22 DIAGNOSIS — J45.30 MILD PERSISTENT ASTHMA WITHOUT COMPLICATION: ICD-10-CM

## 2024-05-22 DIAGNOSIS — Z00.00 ANNUAL PHYSICAL EXAM: ICD-10-CM

## 2024-05-22 DIAGNOSIS — Z00.00 ANNUAL PHYSICAL EXAM: Primary | ICD-10-CM

## 2024-05-22 LAB
ALBUMIN SERPL BCP-MCNC: 4.6 G/DL (ref 3.5–5.2)
ALP SERPL-CCNC: 103 U/L (ref 55–135)
ALT SERPL W/O P-5'-P-CCNC: 28 U/L (ref 10–44)
ANION GAP SERPL CALC-SCNC: 13 MMOL/L (ref 8–16)
AST SERPL-CCNC: 25 U/L (ref 10–40)
BASOPHILS # BLD AUTO: 0.04 K/UL (ref 0–0.2)
BASOPHILS NFR BLD: 0.6 % (ref 0–1.9)
BILIRUB SERPL-MCNC: 0.4 MG/DL (ref 0.1–1)
BUN SERPL-MCNC: 10 MG/DL (ref 6–20)
CALCIUM SERPL-MCNC: 9.8 MG/DL (ref 8.7–10.5)
CHLORIDE SERPL-SCNC: 103 MMOL/L (ref 95–110)
CHOLEST SERPL-MCNC: 243 MG/DL (ref 120–199)
CHOLEST/HDLC SERPL: 3 {RATIO} (ref 2–5)
CO2 SERPL-SCNC: 23 MMOL/L (ref 23–29)
CREAT SERPL-MCNC: 1.1 MG/DL (ref 0.5–1.4)
DIFFERENTIAL METHOD BLD: ABNORMAL
EOSINOPHIL # BLD AUTO: 0.1 K/UL (ref 0–0.5)
EOSINOPHIL NFR BLD: 1.9 % (ref 0–8)
ERYTHROCYTE [DISTWIDTH] IN BLOOD BY AUTOMATED COUNT: 11.7 % (ref 11.5–14.5)
EST. GFR  (NO RACE VARIABLE): >60 ML/MIN/1.73 M^2
ESTIMATED AVG GLUCOSE: 100 MG/DL (ref 68–131)
GLUCOSE SERPL-MCNC: 111 MG/DL (ref 70–110)
HBA1C MFR BLD: 5.1 % (ref 4–5.6)
HCT VFR BLD AUTO: 40.5 % (ref 40–54)
HDLC SERPL-MCNC: 81 MG/DL (ref 40–75)
HDLC SERPL: 33.3 % (ref 20–50)
HGB BLD-MCNC: 14.2 G/DL (ref 14–18)
IMM GRANULOCYTES # BLD AUTO: 0.02 K/UL (ref 0–0.04)
IMM GRANULOCYTES NFR BLD AUTO: 0.3 % (ref 0–0.5)
LDLC SERPL CALC-MCNC: 149.4 MG/DL (ref 63–159)
LYMPHOCYTES # BLD AUTO: 1.7 K/UL (ref 1–4.8)
LYMPHOCYTES NFR BLD: 24.1 % (ref 18–48)
MCH RBC QN AUTO: 31.7 PG (ref 27–31)
MCHC RBC AUTO-ENTMCNC: 35.1 G/DL (ref 32–36)
MCV RBC AUTO: 90 FL (ref 82–98)
MONOCYTES # BLD AUTO: 0.7 K/UL (ref 0.3–1)
MONOCYTES NFR BLD: 10.2 % (ref 4–15)
NEUTROPHILS # BLD AUTO: 4.3 K/UL (ref 1.8–7.7)
NEUTROPHILS NFR BLD: 62.9 % (ref 38–73)
NONHDLC SERPL-MCNC: 162 MG/DL
NRBC BLD-RTO: 0 /100 WBC
PLATELET # BLD AUTO: 234 K/UL (ref 150–450)
PMV BLD AUTO: 9.6 FL (ref 9.2–12.9)
POTASSIUM SERPL-SCNC: 3.7 MMOL/L (ref 3.5–5.1)
PROT SERPL-MCNC: 7.9 G/DL (ref 6–8.4)
RBC # BLD AUTO: 4.48 M/UL (ref 4.6–6.2)
SODIUM SERPL-SCNC: 139 MMOL/L (ref 136–145)
TRIGL SERPL-MCNC: 63 MG/DL (ref 30–150)
TSH SERPL DL<=0.005 MIU/L-ACNC: 2.2 UIU/ML (ref 0.4–4)
WBC # BLD AUTO: 6.88 K/UL (ref 3.9–12.7)

## 2024-05-22 PROCEDURE — 83036 HEMOGLOBIN GLYCOSYLATED A1C: CPT | Performed by: INTERNAL MEDICINE

## 2024-05-22 PROCEDURE — 99395 PREV VISIT EST AGE 18-39: CPT | Mod: S$GLB,,, | Performed by: INTERNAL MEDICINE

## 2024-05-22 PROCEDURE — 80061 LIPID PANEL: CPT | Performed by: INTERNAL MEDICINE

## 2024-05-22 PROCEDURE — 3008F BODY MASS INDEX DOCD: CPT | Mod: CPTII,S$GLB,, | Performed by: INTERNAL MEDICINE

## 2024-05-22 PROCEDURE — 3075F SYST BP GE 130 - 139MM HG: CPT | Mod: CPTII,S$GLB,, | Performed by: INTERNAL MEDICINE

## 2024-05-22 PROCEDURE — 3044F HG A1C LEVEL LT 7.0%: CPT | Mod: CPTII,S$GLB,, | Performed by: INTERNAL MEDICINE

## 2024-05-22 PROCEDURE — 1159F MED LIST DOCD IN RCRD: CPT | Mod: CPTII,S$GLB,, | Performed by: INTERNAL MEDICINE

## 2024-05-22 PROCEDURE — 36415 COLL VENOUS BLD VENIPUNCTURE: CPT | Performed by: INTERNAL MEDICINE

## 2024-05-22 PROCEDURE — 3079F DIAST BP 80-89 MM HG: CPT | Mod: CPTII,S$GLB,, | Performed by: INTERNAL MEDICINE

## 2024-05-22 PROCEDURE — 80053 COMPREHEN METABOLIC PANEL: CPT | Performed by: INTERNAL MEDICINE

## 2024-05-22 PROCEDURE — 99999 PR PBB SHADOW E&M-EST. PATIENT-LVL III: CPT | Mod: PBBFAC,,, | Performed by: INTERNAL MEDICINE

## 2024-05-22 PROCEDURE — 1160F RVW MEDS BY RX/DR IN RCRD: CPT | Mod: CPTII,S$GLB,, | Performed by: INTERNAL MEDICINE

## 2024-05-22 PROCEDURE — 85025 COMPLETE CBC W/AUTO DIFF WBC: CPT | Performed by: INTERNAL MEDICINE

## 2024-05-22 PROCEDURE — 84443 ASSAY THYROID STIM HORMONE: CPT | Performed by: INTERNAL MEDICINE

## 2024-05-22 RX ORDER — FLUTICASONE PROPIONATE AND SALMETEROL 250; 50 UG/1; UG/1
1 POWDER RESPIRATORY (INHALATION) 2 TIMES DAILY
Qty: 180 EACH | Refills: 1 | Status: SHIPPED | OUTPATIENT
Start: 2024-05-22 | End: 2025-05-22

## 2024-05-22 RX ORDER — ALBUTEROL SULFATE 90 UG/1
2 AEROSOL, METERED RESPIRATORY (INHALATION) EVERY 6 HOURS PRN
Qty: 18 G | Refills: 1 | Status: SHIPPED | OUTPATIENT
Start: 2024-05-22

## 2024-05-22 NOTE — PROGRESS NOTES
"Subjective:       Patient ID: Les Whitman III is a 33 y.o. male.    Chief Complaint: Annual Exam    Here for annual exam      Pasts everal weeks has run out of albuterol due to frequent, usually nocturnal symptoms. Start maintenance.    .       Review of Systems   Constitutional:  Negative for appetite change, chills, fever and unexpected weight change.   HENT:  Negative for hearing loss, sore throat and trouble swallowing.    Eyes:  Negative for visual disturbance.   Respiratory:  Negative for cough, chest tightness and shortness of breath.    Cardiovascular:  Negative for chest pain and leg swelling.   Gastrointestinal:  Negative for abdominal pain, blood in stool, constipation, diarrhea, nausea and vomiting.   Endocrine: Negative for polydipsia and polyuria.   Genitourinary:  Negative for decreased urine volume, difficulty urinating, dysuria, frequency and urgency.   Musculoskeletal:  Negative for gait problem.   Skin:  Negative for rash.   Neurological:  Negative for dizziness and numbness.   Psychiatric/Behavioral:  The patient is not nervous/anxious.        Objective:      Vitals:    05/22/24 1553   BP: 134/80   BP Location: Left arm   Patient Position: Sitting   BP Method: Medium (Manual)   Pulse: 76   SpO2: 99%   Weight: 74.8 kg (164 lb 14.5 oz)   Height: 5' 10" (1.778 m)      Physical Exam  Vitals and nursing note reviewed.   Constitutional:       General: He is not in acute distress.     Appearance: Normal appearance. He is well-developed.   HENT:      Head: Normocephalic and atraumatic.      Mouth/Throat:      Pharynx: No oropharyngeal exudate.   Eyes:      General: No scleral icterus.     Conjunctiva/sclera: Conjunctivae normal.      Pupils: Pupils are equal, round, and reactive to light.   Neck:      Thyroid: No thyromegaly.   Cardiovascular:      Rate and Rhythm: Normal rate and regular rhythm.      Heart sounds: Normal heart sounds. No murmur heard.  Pulmonary:      Effort: Pulmonary effort is " "normal.      Breath sounds: Normal breath sounds. No wheezing or rales.   Abdominal:      General: There is no distension.   Musculoskeletal:         General: No tenderness.   Lymphadenopathy:      Cervical: No cervical adenopathy.   Skin:     General: Skin is warm and dry.   Neurological:      Mental Status: He is alert and oriented to person, place, and time.   Psychiatric:         Behavior: Behavior normal.         Assessment:       1. Annual physical exam    2. Mild persistent asthma without complication        Plan:       Les Roldan" was seen today for annual exam.    Diagnoses and all orders for this visit:    Annual physical exam  -     Comprehensive Metabolic Panel; Future  -     Lipid Panel; Future  -     TSH; Future  -     CBC Auto Differential; Future  -     Hemoglobin A1C; Future    Mild persistent asthma without complication  -     albuterol (PROVENTIL/VENTOLIN HFA) 90 mcg/actuation inhaler; Inhale 2 puffs into the lungs every 6 (six) hours as needed for Wheezing.  -     CBC Auto Differential; Future  -     fluticasone-salmeterol diskus inhaler 250-50 mcg; Inhale 1 puff into the lungs 2 (two) times daily. Controller           Rubens Horne MD  Internal Medicine-[facility]        Side effects of medication(s) were discussed in detail and patient voiced understanding.  Patient will call back for any issues or complications.     "

## 2024-07-11 DIAGNOSIS — F41.9 ANXIETY: ICD-10-CM

## 2024-07-11 NOTE — TELEPHONE ENCOUNTER
No care due was identified.  [facility] Embedded Care Due Messages. Reference number: [id number].   7/11/2024 9:30:40 AM CDT

## 2024-07-12 RX ORDER — SERTRALINE HYDROCHLORIDE 50 MG/1
50 TABLET, FILM COATED ORAL DAILY
Qty: 90 TABLET | Refills: 2 | Status: SHIPPED | OUTPATIENT
Start: 2024-07-12 | End: 2025-07-12

## (undated) DEVICE — DRESSING N ADH OIL EMUL 3X3

## (undated) DEVICE — GAUZE DERMACEA LOW PLY 2X4YRDS

## (undated) DEVICE — CORD BIPOLAR 12 FOOT

## (undated) DEVICE — BLADE SCALP OPHTL RND TIP

## (undated) DEVICE — FORCEP STRAIGHT DISP

## (undated) DEVICE — PAD CAST SPECIALIST STRL 4

## (undated) DEVICE — GAUZE SPONGE 4X4 12PLY

## (undated) DEVICE — SUT MONOCRYL 4-0 PS-2

## (undated) DEVICE — BANDAGE ELASTIC ACE 2IN 10/CA

## (undated) DEVICE — SUT CTD VICRYL VIL BR SH 27

## (undated) DEVICE — BLADE #15 STERILE CARBON

## (undated) DEVICE — APPLICATOR CHLORAPREP ORN 26ML

## (undated) DEVICE — DRAPE STERI-DRAPE 1000 17X11IN

## (undated) DEVICE — SUT BONE WAX 2.5 GRMS 12/BX

## (undated) DEVICE — TRAY MINOR ORTHO

## (undated) DEVICE — GLOVE BIOGEL SKINSENSE PI 7.0

## (undated) DEVICE — GLOVE BIOGEL PI MICRO INDIC 7

## (undated) DEVICE — SEE MEDLINE ITEM 157131

## (undated) DEVICE — SPLINT PLASTER EXT FAST 4X15

## (undated) DEVICE — NDL 22GA X1 1/2 REG BEVEL

## (undated) DEVICE — SUT ETHILON 4-0 PS2 18 BLK

## (undated) DEVICE — CLOSURE SKIN STERI STRIP 1/2X4

## (undated) DEVICE — SEE MEDLINE ITEM 157173

## (undated) DEVICE — SEE MEDLINE ITEM 152522

## (undated) DEVICE — CONTAINER SPECIMEN STRL 4OZ

## (undated) DEVICE — SLING ARM LARGE FOAM STRAP

## (undated) DEVICE — SPONGE DERMACEA 4X4IN 12PLY

## (undated) DEVICE — BLADE MICRO REC SMALL CROSS